# Patient Record
Sex: FEMALE | Race: BLACK OR AFRICAN AMERICAN | NOT HISPANIC OR LATINO | Employment: FULL TIME | URBAN - METROPOLITAN AREA
[De-identification: names, ages, dates, MRNs, and addresses within clinical notes are randomized per-mention and may not be internally consistent; named-entity substitution may affect disease eponyms.]

---

## 2019-03-01 PROCEDURE — 99284 EMERGENCY DEPT VISIT MOD MDM: CPT | Mod: ER

## 2019-03-02 ENCOUNTER — HOSPITAL ENCOUNTER (EMERGENCY)
Facility: HOSPITAL | Age: 39
Discharge: HOME | End: 2019-03-02
Attending: INTERNAL MEDICINE
Payer: MEDICAID

## 2019-03-02 VITALS
WEIGHT: 122 LBS | SYSTOLIC BLOOD PRESSURE: 130 MMHG | DIASTOLIC BLOOD PRESSURE: 87 MMHG | HEART RATE: 70 BPM | BODY MASS INDEX: 24.6 KG/M2 | HEIGHT: 59 IN | OXYGEN SATURATION: 100 % | TEMPERATURE: 98 F | RESPIRATION RATE: 18 BRPM

## 2019-03-02 DIAGNOSIS — N30.01 ACUTE CYSTITIS WITH HEMATURIA: Primary | ICD-10-CM

## 2019-03-02 LAB
B-HCG UR QL: NEGATIVE
BILIRUBIN, POC UA: NEGATIVE
BLOOD, POC UA: ABNORMAL
CLARITY, POC UA: CLEAR
COLOR, POC UA: YELLOW
CTP QC/QA: YES
GLUCOSE, POC UA: NEGATIVE
KETONES, POC UA: ABNORMAL
LEUKOCYTE EST, POC UA: ABNORMAL
NITRITE, POC UA: NEGATIVE
PH UR STRIP: 6 [PH]
PROTEIN, POC UA: ABNORMAL
SPECIFIC GRAVITY, POC UA: 1.02
UROBILINOGEN, POC UA: 1 E.U./DL

## 2019-03-02 PROCEDURE — 25000003 PHARM REV CODE 250: Mod: ER | Performed by: INTERNAL MEDICINE

## 2019-03-02 PROCEDURE — 81003 URINALYSIS AUTO W/O SCOPE: CPT | Mod: ER

## 2019-03-02 PROCEDURE — 81025 URINE PREGNANCY TEST: CPT | Mod: ER | Performed by: INTERNAL MEDICINE

## 2019-03-02 RX ORDER — PHENAZOPYRIDINE HYDROCHLORIDE 200 MG/1
200 TABLET, FILM COATED ORAL 3 TIMES DAILY
Qty: 6 TABLET | Refills: 0 | Status: SHIPPED | OUTPATIENT
Start: 2019-03-02 | End: 2019-03-04

## 2019-03-02 RX ORDER — PHENAZOPYRIDINE HYDROCHLORIDE 100 MG/1
200 TABLET, FILM COATED ORAL
Status: COMPLETED | OUTPATIENT
Start: 2019-03-02 | End: 2019-03-02

## 2019-03-02 RX ORDER — CEPHALEXIN 500 MG/1
500 CAPSULE ORAL EVERY 12 HOURS
Qty: 20 CAPSULE | Refills: 0 | Status: SHIPPED | OUTPATIENT
Start: 2019-03-02 | End: 2019-03-12

## 2019-03-02 RX ORDER — CEPHALEXIN 500 MG/1
500 CAPSULE ORAL
Status: COMPLETED | OUTPATIENT
Start: 2019-03-02 | End: 2019-03-02

## 2019-03-02 RX ADMIN — PHENAZOPYRIDINE HYDROCHLORIDE 200 MG: 100 TABLET ORAL at 12:03

## 2019-03-02 RX ADMIN — CEPHALEXIN 500 MG: 500 CAPSULE ORAL at 12:03

## 2019-03-02 NOTE — ED PROVIDER NOTES
"Encounter Date: 3/1/2019    SCRIBE #1 NOTE: I, Sang Allan, am scribing for, and in the presence of,  Dr. Desai. I have scribed the following portions of the note - Other sections scribed: HPI, ROS, PE.       History     Chief Complaint   Patient presents with    painful and frequent urination     pt c/o painful and frequent urination x "few " days, denies n/v/d or other complaints     This is a 38 y.o. female who presents to the ED with a complaint of suprapubic abdominal pain that began a few days ago.  Pt endorses urinary frequency.  She has not taken medication PTA.  Palpation worsens her pain. She denies fever, chills, nausea, emesis, diarrhea, vaginal bleeding/discharge, or dysuria.          The history is provided by the patient.   Abdominal Pain   The current episode started just prior to arrival. The onset of the illness was gradual. The problem has not changed since onset.The abdominal pain is located in the suprapubic region. The abdominal pain does not radiate. The abdominal pain is relieved by nothing. Exacerbated by: Nothing. The other symptoms of the illness do not include fever, vomiting, diarrhea, dysuria, vaginal discharge or vaginal bleeding.   The patient states that she believes she is currently not pregnant. Additional symptoms associated with the illness include frequency. Symptoms associated with the illness do not include chills, hematuria or back pain.     Review of patient's allergies indicates:  No Known Allergies  History reviewed. No pertinent past medical history.  Past Surgical History:   Procedure Laterality Date    tubaligation       History reviewed. No pertinent family history.  Social History     Tobacco Use    Smoking status: Current Every Day Smoker    Tobacco comment: 2-3 cigs daily   Substance Use Topics    Alcohol use: No     Frequency: Never    Drug use: No     Review of Systems   Constitutional: Negative for chills and fever.   Gastrointestinal: Positive for " abdominal pain. Negative for diarrhea and vomiting.   Genitourinary: Positive for frequency. Negative for dysuria, hematuria, vaginal bleeding and vaginal discharge.   Musculoskeletal: Negative for back pain.   All other systems reviewed and are negative.      Physical Exam     Initial Vitals [03/02/19 0003]   BP Pulse Resp Temp SpO2   135/88 74 16 97.6 °F (36.4 °C) 100 %      MAP       --         Physical Exam    Nursing note and vitals reviewed.  Constitutional: She appears well-developed and well-nourished.   HENT:   Head: Normocephalic and atraumatic.   Eyes: Conjunctivae are normal.   Neck: Normal range of motion. Neck supple.   Cardiovascular: Normal rate, regular rhythm, normal heart sounds and intact distal pulses. Exam reveals no gallop and no friction rub.    No murmur heard.  Pulmonary/Chest: Effort normal and breath sounds normal. No respiratory distress. She has no wheezes. She has no rhonchi. She has no rales. She exhibits no tenderness.   Abdominal: Soft. There is tenderness in the suprapubic area. There is no rigidity, no rebound, no guarding and no CVA tenderness.   Musculoskeletal: Normal range of motion.        Lumbar back: She exhibits normal range of motion, no tenderness, no bony tenderness, no swelling, no edema, no deformity, no laceration, no pain, no spasm and normal pulse.   Neurological: She is alert and oriented to person, place, and time.   Skin: Skin is warm and dry.   Psychiatric: She has a normal mood and affect.         ED Course   Procedures  Labs Reviewed   POCT URINALYSIS W/O SCOPE - Abnormal; Notable for the following components:       Result Value    Glucose, UA Negative (*)     Bilirubin, UA Negative (*)     Ketones, UA Trace (*)     Blood, UA 3+ (*)     Protein, UA 1+ (*)     Nitrite, UA Negative (*)     Leukocytes, UA 1+ (*)     All other components within normal limits   POCT URINALYSIS W/O SCOPE   POCT URINE PREGNANCY          Imaging Results    None          Medical  Decision Making:   Initial Assessment:   This is a 38 y.o. female who presents to the ED with a complaint of suprapubic abdominal pain that began a few days ago.  Pt endorses urinary frequency.  She has not taken medication PTA.  Palpation worsens her pain. She denies fever, chills, nausea, emesis, diarrhea, vaginal bleeding/discharge, or dysuria.  Clinical Tests:   Lab Tests: Ordered and Reviewed  ED Management:  Urinalysis reveals signs of infection.  Patient was given instructions for acute cystitis and received Keflex in the emergency department as well as a prescription.  She was advised to follow up with her primary care physician within the next 4 days for re-evaluation and to return to the emergency department if condition worsens.            Scribe Attestation:   Scribe #1: I performed the above scribed service and the documentation accurately describes the services I performed. I attest to the accuracy of the note.    This document was produced by a scribe under my direction and in my presence. I agree with the content of the note and have made any necessary edits.     Dr. Desai    03/02/2019 5:31 AM             Clinical Impression:     1. Acute cystitis with hematuria           Disposition:   Disposition: Discharged  Condition: Stable                        Pedro Desai MD  03/02/19 0531

## 2019-03-18 ENCOUNTER — HOSPITAL ENCOUNTER (EMERGENCY)
Facility: HOSPITAL | Age: 39
Discharge: HOME | End: 2019-03-18
Attending: INTERNAL MEDICINE
Payer: MEDICAID

## 2019-03-18 VITALS
OXYGEN SATURATION: 99 % | SYSTOLIC BLOOD PRESSURE: 124 MMHG | DIASTOLIC BLOOD PRESSURE: 79 MMHG | BODY MASS INDEX: 24.6 KG/M2 | HEART RATE: 78 BPM | TEMPERATURE: 98 F | HEIGHT: 59 IN | WEIGHT: 122 LBS | RESPIRATION RATE: 17 BRPM

## 2019-03-18 DIAGNOSIS — M54.32 SCIATICA OF LEFT SIDE: Primary | ICD-10-CM

## 2019-03-18 PROBLEM — N10 ACUTE PYELONEPHRITIS: Status: ACTIVE | Noted: 2019-03-18

## 2019-03-18 LAB
B-HCG UR QL: NEGATIVE
BILIRUBIN, POC UA: ABNORMAL
BLOOD, POC UA: NEGATIVE
CLARITY, POC UA: CLEAR
COLOR, POC UA: ABNORMAL
CTP QC/QA: YES
GLUCOSE, POC UA: NEGATIVE
KETONES, POC UA: ABNORMAL
LEUKOCYTE EST, POC UA: NEGATIVE
NITRITE, POC UA: NEGATIVE
PH UR STRIP: 6 [PH]
PROTEIN, POC UA: ABNORMAL
SPECIFIC GRAVITY, POC UA: >=1.03
UROBILINOGEN, POC UA: 0.2 E.U./DL

## 2019-03-18 PROCEDURE — 81025 URINE PREGNANCY TEST: CPT | Mod: ER | Performed by: INTERNAL MEDICINE

## 2019-03-18 PROCEDURE — 25000003 PHARM REV CODE 250: Mod: ER | Performed by: INTERNAL MEDICINE

## 2019-03-18 PROCEDURE — 81003 URINALYSIS AUTO W/O SCOPE: CPT | Mod: ER

## 2019-03-18 PROCEDURE — 99283 EMERGENCY DEPT VISIT LOW MDM: CPT | Mod: ER

## 2019-03-18 RX ORDER — IBUPROFEN 600 MG/1
600 TABLET ORAL
Status: COMPLETED | OUTPATIENT
Start: 2019-03-18 | End: 2019-03-18

## 2019-03-18 RX ORDER — IBUPROFEN 600 MG/1
600 TABLET ORAL 3 TIMES DAILY
Qty: 30 TABLET | Refills: 0 | OUTPATIENT
Start: 2019-03-18 | End: 2021-08-09

## 2019-03-18 RX ORDER — CEPHALEXIN 500 MG/1
500 CAPSULE ORAL EVERY 12 HOURS
Qty: 20 CAPSULE | Refills: 0 | Status: SHIPPED | OUTPATIENT
Start: 2019-03-18 | End: 2019-03-18 | Stop reason: SDUPTHER

## 2019-03-18 RX ORDER — IBUPROFEN 600 MG/1
600 TABLET ORAL 3 TIMES DAILY
Qty: 30 TABLET | Refills: 0 | Status: SHIPPED | OUTPATIENT
Start: 2019-03-18 | End: 2019-03-18 | Stop reason: SDUPTHER

## 2019-03-18 RX ORDER — CEPHALEXIN 500 MG/1
500 CAPSULE ORAL
Status: COMPLETED | OUTPATIENT
Start: 2019-03-18 | End: 2019-03-18

## 2019-03-18 RX ADMIN — IBUPROFEN 600 MG: 600 TABLET, FILM COATED ORAL at 03:03

## 2019-03-18 RX ADMIN — CEPHALEXIN 500 MG: 500 CAPSULE ORAL at 02:03

## 2019-03-18 NOTE — ED PROVIDER NOTES
Encounter Date: 3/18/2019       History     Chief Complaint   Patient presents with    Back Pain     pt c/o back and r flank pain x 1 week,pt denies dysuria     38-year-old female presents to the emergency department complaining of left lower back pain radiating to left buttocks x1 week.  She denies fever/chills, nausea/vomiting/urinary frequency/dysuria.      The history is provided by the patient. No  was used.     Review of patient's allergies indicates:  No Known Allergies  History reviewed. No pertinent past medical history.  Past Surgical History:   Procedure Laterality Date    tubaligation       History reviewed. No pertinent family history.  Social History     Tobacco Use    Smoking status: Current Every Day Smoker    Tobacco comment: 2-3 cigs daily   Substance Use Topics    Alcohol use: No     Frequency: Never    Drug use: No     Review of Systems   Genitourinary: Negative for difficulty urinating, dysuria, enuresis, flank pain, frequency, hematuria, pelvic pain, urgency, vaginal bleeding and vaginal discharge.   Musculoskeletal: Positive for back pain.   All other systems reviewed and are negative.      Physical Exam     Initial Vitals   BP Pulse Resp Temp SpO2   03/18/19 0104 03/18/19 0103 03/18/19 0103 03/18/19 0104 03/18/19 0103   117/86 83 18 98.3 °F (36.8 °C) 100 %      MAP       --                Physical Exam    Nursing note and vitals reviewed.  Constitutional: She appears well-developed and well-nourished. No distress.   HENT:   Head: Normocephalic and atraumatic.   Right Ear: External ear normal.   Left Ear: External ear normal.   Eyes: Conjunctivae and EOM are normal.   Neck: Normal range of motion. Neck supple.   Cardiovascular: Normal rate and regular rhythm.   Pulmonary/Chest: Breath sounds normal. No respiratory distress.   Abdominal: Soft. Bowel sounds are normal.   Musculoskeletal: Normal range of motion.   Left lumbar pain upon movement with radiation to her left  buttocks and thigh   Neurological: She is alert and oriented to person, place, and time. She has normal strength.   Skin: Skin is warm and dry.   Psychiatric: She has a normal mood and affect. Her behavior is normal. Thought content normal.         ED Course   Procedures  Labs Reviewed   POCT URINALYSIS W/O SCOPE - Abnormal; Notable for the following components:       Result Value    Glucose, UA Negative (*)     Bilirubin, UA 1+ (*)     Ketones, UA Trace (*)     Spec Grav UA >=1.030 (*)     Blood, UA Negative (*)     Protein, UA Trace (*)     Nitrite, UA Negative (*)     Leukocytes, UA Negative (*)     All other components within normal limits   POCT URINALYSIS W/O SCOPE   POCT URINE PREGNANCY          Imaging Results    None          Medical Decision Making:   Initial Assessment:   38-year-old female presents to the emergency department complaining of left lower back pain radiating to left buttocks x1 week.  She denies fever/chills, nausea/vomiting/urinary frequency/dysuria.  ED Management:  Patient was given instructions for sciatica of left-sided and prescriptions for ibuprofen.  Ibuprofen was given in the emergency department and patient was advised to follow up with her primary care physician tomorrow for re-evaluation/return to the emergency department if condition worsens.                      Clinical Impression:       ICD-10-CM ICD-9-CM   1. Sciatica of left side M54.32 724.3         Disposition:   Disposition: Discharged  Condition: Stable                        Pedro Desai MD  03/18/19 0621

## 2019-04-04 ENCOUNTER — HOSPITAL ENCOUNTER (EMERGENCY)
Facility: HOSPITAL | Age: 39
Discharge: HOME | End: 2019-04-04
Attending: INTERNAL MEDICINE
Payer: MEDICAID

## 2019-04-04 VITALS
RESPIRATION RATE: 16 BRPM | OXYGEN SATURATION: 99 % | HEART RATE: 77 BPM | BODY MASS INDEX: 24.8 KG/M2 | TEMPERATURE: 98 F | WEIGHT: 123 LBS | DIASTOLIC BLOOD PRESSURE: 91 MMHG | SYSTOLIC BLOOD PRESSURE: 122 MMHG | HEIGHT: 59 IN

## 2019-04-04 DIAGNOSIS — A08.4 VIRAL GASTROENTERITIS: Primary | ICD-10-CM

## 2019-04-04 LAB
B-HCG UR QL: NEGATIVE
CTP QC/QA: YES

## 2019-04-04 PROCEDURE — 25000003 PHARM REV CODE 250: Mod: ER | Performed by: INTERNAL MEDICINE

## 2019-04-04 PROCEDURE — 81025 URINE PREGNANCY TEST: CPT | Mod: ER | Performed by: INTERNAL MEDICINE

## 2019-04-04 PROCEDURE — 99283 EMERGENCY DEPT VISIT LOW MDM: CPT | Mod: ER

## 2019-04-04 RX ORDER — ONDANSETRON 4 MG/1
8 TABLET, ORALLY DISINTEGRATING ORAL
Status: COMPLETED | OUTPATIENT
Start: 2019-04-04 | End: 2019-04-04

## 2019-04-04 RX ORDER — ONDANSETRON 4 MG/1
4 TABLET, FILM COATED ORAL EVERY 6 HOURS PRN
Qty: 12 TABLET | Refills: 0 | OUTPATIENT
Start: 2019-04-04 | End: 2019-10-20

## 2019-04-04 RX ADMIN — ONDANSETRON 8 MG: 4 TABLET, ORALLY DISINTEGRATING ORAL at 07:04

## 2019-04-05 NOTE — ED PROVIDER NOTES
Encounter Date: 4/4/2019    SCRIBE #1 NOTE: I, Sang Allan, am scribing for, and in the presence of,  Dr. Desai. I have scribed the following portions of the note - Other sections scribed: HPI, ROS, PE.       History     Chief Complaint   Patient presents with    Vomiting     pt reports 2 episodes of vomiting and 4 episodes of diarrhea since yesterday with continous nausea. Denies any abd pain     CC:   N/V/D  HPI:  This is a 38 y.o. female who presents to the ED with a chief complaint of acute N/V/D that began yesterday.  Pt hs had three episodes of emesis and three episodes of diarrhea today.  She denies fever, chills, SOB, CP, or abdominal pain.  She has not taken medication for her symptoms.         The history is provided by the patient.     Review of patient's allergies indicates:  No Known Allergies  History reviewed. No pertinent past medical history.  Past Surgical History:   Procedure Laterality Date    tubaligation       History reviewed. No pertinent family history.  Social History     Tobacco Use    Smoking status: Current Every Day Smoker     Packs/day: 0.50    Smokeless tobacco: Never Used    Tobacco comment: 2-3 cigs daily   Substance Use Topics    Alcohol use: No     Frequency: Never    Drug use: No     Review of Systems   Constitutional: Negative for chills and fever.   Respiratory: Negative for cough and shortness of breath.    Cardiovascular: Negative for chest pain.   Gastrointestinal: Positive for diarrhea, nausea and vomiting. Negative for abdominal pain.   All other systems reviewed and are negative.      Physical Exam     Initial Vitals [04/04/19 1900]   BP Pulse Resp Temp SpO2   (!) 122/91 77 16 98.2 °F (36.8 °C) 99 %      MAP       --         Physical Exam    Nursing note and vitals reviewed.  Constitutional: She appears well-developed and well-nourished.   HENT:   Head: Normocephalic and atraumatic.   Eyes: Conjunctivae are normal.   Neck: Normal range of motion. Neck supple.    Cardiovascular: Normal rate, regular rhythm, normal heart sounds and intact distal pulses. Exam reveals no gallop and no friction rub.    No murmur heard.  Pulmonary/Chest: Effort normal and breath sounds normal. No respiratory distress. She has no wheezes. She has no rhonchi. She has no rales. She exhibits no tenderness.   Abdominal: Soft. Bowel sounds are normal. She exhibits no distension and no mass. There is no tenderness. There is no rebound and no guarding.   Musculoskeletal: Normal range of motion.   Neurological: She is alert and oriented to person, place, and time.   Skin: Skin is warm and dry.   Psychiatric: She has a normal mood and affect.         ED Course   Procedures  Labs Reviewed   POCT URINE PREGNANCY          Imaging Results    None          Medical Decision Making:   Initial Assessment:   This is a 38 y.o. female who presents to the ED with a chief complaint of acute N/V/D that began yesterday.  Pt hs had three episodes of emesis and three episodes of diarrhea today.  She denies fever, chills, SOB, CP, or abdominal pain.  She has not taken medication for her symptoms.       Clinical Tests:   Lab Tests: Ordered            Scribe Attestation:   Scribe #1: I performed the above scribed service and the documentation accurately describes the services I performed. I attest to the accuracy of the note.    This document was produced by a scribe under my direction and in my presence. I agree with the content of the note and have made any necessary edits.     Dr. Desai    04/05/2019 3:04 AM             Clinical Impression:     1. Viral gastroenteritis           Disposition:   Disposition: Discharged  Condition: Stable                        Pedro Desai MD  04/05/19 3796

## 2019-10-20 ENCOUNTER — HOSPITAL ENCOUNTER (EMERGENCY)
Facility: HOSPITAL | Age: 39
Discharge: HOME | End: 2019-10-20
Attending: INTERNAL MEDICINE
Payer: MEDICAID

## 2019-10-20 VITALS
HEART RATE: 78 BPM | BODY MASS INDEX: 23.79 KG/M2 | SYSTOLIC BLOOD PRESSURE: 126 MMHG | DIASTOLIC BLOOD PRESSURE: 75 MMHG | OXYGEN SATURATION: 99 % | WEIGHT: 118 LBS | HEIGHT: 59 IN | RESPIRATION RATE: 20 BRPM | TEMPERATURE: 98 F

## 2019-10-20 DIAGNOSIS — R11.0 NAUSEA: Primary | ICD-10-CM

## 2019-10-20 LAB
B-HCG UR QL: NEGATIVE
BILIRUBIN, POC UA: ABNORMAL
BLOOD, POC UA: NEGATIVE
CLARITY, POC UA: CLEAR
COLOR, POC UA: ABNORMAL
CTP QC/QA: YES
GLUCOSE, POC UA: NEGATIVE
KETONES, POC UA: ABNORMAL
LEUKOCYTE EST, POC UA: NEGATIVE
NITRITE, POC UA: NEGATIVE
PH UR STRIP: 6 [PH]
PROTEIN, POC UA: ABNORMAL
SPECIFIC GRAVITY, POC UA: 1.02
UROBILINOGEN, POC UA: 1 E.U./DL

## 2019-10-20 PROCEDURE — 25000003 PHARM REV CODE 250: Mod: ER | Performed by: INTERNAL MEDICINE

## 2019-10-20 PROCEDURE — 81003 URINALYSIS AUTO W/O SCOPE: CPT | Mod: ER

## 2019-10-20 PROCEDURE — 81025 URINE PREGNANCY TEST: CPT | Mod: ER | Performed by: INTERNAL MEDICINE

## 2019-10-20 PROCEDURE — 99283 EMERGENCY DEPT VISIT LOW MDM: CPT | Mod: 25,ER

## 2019-10-20 RX ORDER — ONDANSETRON 4 MG/1
8 TABLET, ORALLY DISINTEGRATING ORAL
Status: COMPLETED | OUTPATIENT
Start: 2019-10-20 | End: 2019-10-20

## 2019-10-20 RX ORDER — ONDANSETRON 4 MG/1
4 TABLET, FILM COATED ORAL EVERY 6 HOURS PRN
Qty: 12 TABLET | Refills: 0 | OUTPATIENT
Start: 2019-10-20 | End: 2021-08-09

## 2019-10-20 RX ADMIN — ONDANSETRON 8 MG: 4 TABLET, ORALLY DISINTEGRATING ORAL at 09:10

## 2019-10-21 NOTE — ED NOTES
Pt walked in to waiting room enquiring if she had been called to a room or not.  Pt was not seen outside earlier but is now here requesting to be seen.

## 2020-01-21 ENCOUNTER — HOSPITAL ENCOUNTER (EMERGENCY)
Facility: HOSPITAL | Age: 40
Discharge: HOME | End: 2020-01-21
Attending: EMERGENCY MEDICINE
Payer: MEDICAID

## 2020-01-21 VITALS
RESPIRATION RATE: 20 BRPM | SYSTOLIC BLOOD PRESSURE: 110 MMHG | DIASTOLIC BLOOD PRESSURE: 73 MMHG | WEIGHT: 118 LBS | TEMPERATURE: 98 F | BODY MASS INDEX: 23.79 KG/M2 | OXYGEN SATURATION: 100 % | HEIGHT: 59 IN | HEART RATE: 61 BPM

## 2020-01-21 DIAGNOSIS — M54.9 BACK PAIN, UNSPECIFIED BACK LOCATION, UNSPECIFIED BACK PAIN LATERALITY, UNSPECIFIED CHRONICITY: ICD-10-CM

## 2020-01-21 DIAGNOSIS — M79.10 MYALGIA: ICD-10-CM

## 2020-01-21 DIAGNOSIS — J11.1 INFLUENZA: Primary | ICD-10-CM

## 2020-01-21 LAB
B-HCG UR QL: NEGATIVE
BILIRUBIN, POC UA: ABNORMAL
BLOOD, POC UA: NEGATIVE
CLARITY, POC UA: CLEAR
COLOR, POC UA: YELLOW
CTP QC/QA: YES
CTP QC/QA: YES
GLUCOSE, POC UA: NEGATIVE
KETONES, POC UA: NEGATIVE
LEUKOCYTE EST, POC UA: NEGATIVE
NITRITE, POC UA: NEGATIVE
PH UR STRIP: 5.5 [PH]
POC MOLECULAR INFLUENZA A AGN: NEGATIVE
POC MOLECULAR INFLUENZA B AGN: NEGATIVE
PROTEIN, POC UA: ABNORMAL
SPECIFIC GRAVITY, POC UA: >=1.03
UROBILINOGEN, POC UA: 0.2 E.U./DL

## 2020-01-21 PROCEDURE — 81003 URINALYSIS AUTO W/O SCOPE: CPT | Mod: ER

## 2020-01-21 PROCEDURE — 81025 URINE PREGNANCY TEST: CPT | Mod: ER | Performed by: EMERGENCY MEDICINE

## 2020-01-21 PROCEDURE — 87502 INFLUENZA DNA AMP PROBE: CPT | Mod: ER

## 2020-01-21 PROCEDURE — 99284 EMERGENCY DEPT VISIT MOD MDM: CPT | Mod: ER

## 2020-01-21 RX ORDER — DICLOFENAC SODIUM 50 MG/1
50 TABLET, DELAYED RELEASE ORAL 2 TIMES DAILY
Qty: 20 TABLET | Refills: 0 | OUTPATIENT
Start: 2020-01-21 | End: 2021-08-09

## 2020-01-21 RX ORDER — OSELTAMIVIR PHOSPHATE 75 MG/1
75 CAPSULE ORAL 2 TIMES DAILY
Qty: 10 CAPSULE | Refills: 0 | Status: SHIPPED | OUTPATIENT
Start: 2020-01-21 | End: 2020-01-26

## 2020-01-21 RX ORDER — BENZONATATE 100 MG/1
100 CAPSULE ORAL 3 TIMES DAILY PRN
Qty: 20 CAPSULE | Refills: 0 | Status: SHIPPED | OUTPATIENT
Start: 2020-01-21 | End: 2020-01-31

## 2020-01-21 NOTE — ED PROVIDER NOTES
"Encounter Date: 1/21/2020    SCRIBE #1 NOTE: I, Harlan Miner, am scribing for, and in the presence of,  DONNY Gomez. I have scribed the following portions of the note - Other sections scribed: HPI, ROS, PE.       History     Chief Complaint   Patient presents with    Influenza     onset several days, chills, cough, congestion.    Back Pain     hx of sciatica, onset last night, lower back pain     39 year old female with sciatica presents to the ED with constant lower back pain onset last night. 8/10, described as "dull", exacerbated with movement, relieved with sitting still. Patient also reports fevers, chills, coughing, runny nose (clear or green), congestion, body aches, diarrhea, and nausea. Denies any injury/trauma, numbness, weakness, urinary problems, sore throat, ear pain, or vomiting. Positive sick contact with children diagnosed with flu. Patient is a cook in a restaurant.    The history is provided by the patient. No  was used.     Review of patient's allergies indicates:  No Known Allergies  History reviewed. No pertinent past medical history.  Past Surgical History:   Procedure Laterality Date    tubaligation       History reviewed. No pertinent family history.  Social History     Tobacco Use    Smoking status: Current Every Day Smoker     Packs/day: 0.50    Smokeless tobacco: Never Used    Tobacco comment: 2-3 cigs daily   Substance Use Topics    Alcohol use: No     Frequency: Never    Drug use: No     Review of Systems   Constitutional: Positive for chills and fever.   HENT: Positive for congestion and rhinorrhea. Negative for ear pain and sore throat.    Respiratory: Positive for cough.    Gastrointestinal: Positive for diarrhea and nausea. Negative for constipation and vomiting.   Genitourinary: Negative for decreased urine volume, difficulty urinating, dysuria, hematuria and urgency.   Musculoskeletal: Positive for back pain and myalgias.   Neurological: Negative " for weakness and numbness.   All other systems reviewed and are negative.      Physical Exam     Initial Vitals [01/21/20 1251]   BP Pulse Resp Temp SpO2   110/73 61 20 98.4 °F (36.9 °C) 100 %      MAP       --         Physical Exam    Nursing note and vitals reviewed.  Constitutional: She appears well-developed and well-nourished. She is not diaphoretic. No distress.   HENT:   Head: Normocephalic and atraumatic.   Right Ear: External ear normal.   Left Ear: External ear normal.   Nose: Nose normal.   Mouth/Throat: Oropharynx is clear and moist.   There is mild bilateral nasal congestion noted.  There is mild posterior pharyngeal erythema without tonsillar exudate.   Eyes: Conjunctivae are normal.   Neck: Normal range of motion. Neck supple.   Cardiovascular: Normal rate.   Pulmonary/Chest: Breath sounds normal. No respiratory distress. She has no wheezes. She has no rhonchi. She has no rales. She exhibits no tenderness.   No CVA tenderness noted.   Abdominal: Soft. Bowel sounds are normal. She exhibits no distension. There is no tenderness. There is no rebound and no guarding.   Musculoskeletal: Normal range of motion.   Neurological: She is alert and oriented to person, place, and time.   Skin: Skin is warm and dry. Capillary refill takes less than 2 seconds. No rash noted. No erythema.   Psychiatric: She has a normal mood and affect.         ED Course   Procedures  Labs Reviewed   POCT URINALYSIS W/O SCOPE - Abnormal; Notable for the following components:       Result Value    Bilirubin, UA 1+ (*)     Spec Grav UA >=1.030 (*)     Protein, UA Trace (*)     All other components within normal limits   POCT URINE PREGNANCY   POCT INFLUENZA A/B MOLECULAR          Imaging Results    None       X-Rays:   Independently Interpreted Readings:   Other Readings:    X-ray of the lumbar spine reveals    Medical Decision Making:   History:   Old Medical Records: I decided to obtain old medical records.  Clinical Tests:   Lab  Tests: Ordered and Reviewed  The following lab test(s) were unremarkable: UPT       APC / Resident Notes:   This is an urgent evaluation of a 39-year-old female with a past medical history of sciatica who presents to the emergency department complaining of lower back pain and flu-like symptoms. She reports that both of her children were recently diagnosed with the flu.  Her  is also sick with similar symptoms.    The patient is currently afebrile and nontoxic in appearance.  Vital signs are stable. On physical exam, there is bilateral nasal congestion noted.  There is mild posterior pharyngeal erythema.  However, the bilateral lungs are clear to auscultation.  There is no CVA tenderness noted.  There is a mild tenderness along the bilateral lower lumbar spine region.  There is no midline spinal tenderness or bony step-off noted.  The remaining physical exam is unremarkable.  Urinalysis was performed which revealed no evidence of urinary tract infection.  I carefully considered but doubt pyelonephritis.  Rapid influenza was performed which was negative. I believe this patient has the flu despite her negative influenza screen.  The patient is too sick contacts at recently had similar symptoms. Therefore, will treat with Tamiflu.  Carefully considered but doubt pneumonia.  She is currently safe and stable for discharge at this time.       Scribe Attestation:   Scribe #1: I performed the above scribed service and the documentation accurately describes the services I performed. I attest to the accuracy of the note.                          Clinical Impression:     1. Influenza    2. Myalgia    3. Back pain, unspecified back location, unspecified back pain laterality, unspecified chronicity            Disposition:   Disposition: Discharged  Condition: Stable                     Madai Beebe PA-C  01/21/20 0163

## 2020-01-21 NOTE — DISCHARGE INSTRUCTIONS
Rest.  Drink plenty of fluids.  Return to the emergency department if you have any change or concerning symptoms.  Take medication with food.  Please follow-up with your doctor within 48 hr.

## 2020-10-15 ENCOUNTER — HOSPITAL ENCOUNTER (EMERGENCY)
Facility: HOSPITAL | Age: 40
Discharge: HOME | End: 2020-10-15
Attending: EMERGENCY MEDICINE
Payer: MEDICAID

## 2020-10-15 VITALS
OXYGEN SATURATION: 97 % | SYSTOLIC BLOOD PRESSURE: 127 MMHG | HEART RATE: 77 BPM | RESPIRATION RATE: 18 BRPM | HEIGHT: 59 IN | WEIGHT: 115 LBS | BODY MASS INDEX: 23.18 KG/M2 | DIASTOLIC BLOOD PRESSURE: 68 MMHG | TEMPERATURE: 97 F

## 2020-10-15 DIAGNOSIS — N39.0 URINARY TRACT INFECTION WITH HEMATURIA, SITE UNSPECIFIED: Primary | ICD-10-CM

## 2020-10-15 DIAGNOSIS — R31.9 URINARY TRACT INFECTION WITH HEMATURIA, SITE UNSPECIFIED: Primary | ICD-10-CM

## 2020-10-15 LAB
B-HCG UR QL: NEGATIVE
BILIRUBIN, POC UA: ABNORMAL
BLOOD, POC UA: ABNORMAL
CLARITY, POC UA: CLEAR
COLOR, POC UA: ABNORMAL
CTP QC/QA: YES
GLUCOSE, POC UA: NEGATIVE
KETONES, POC UA: NEGATIVE
LEUKOCYTE EST, POC UA: ABNORMAL
NITRITE, POC UA: NEGATIVE
PH UR STRIP: 5.5 [PH]
PROTEIN, POC UA: ABNORMAL
SPECIFIC GRAVITY, POC UA: >=1.03
UROBILINOGEN, POC UA: 1 E.U./DL

## 2020-10-15 PROCEDURE — 25000003 PHARM REV CODE 250: Mod: ER | Performed by: NURSE PRACTITIONER

## 2020-10-15 PROCEDURE — 87186 SC STD MICRODIL/AGAR DIL: CPT

## 2020-10-15 PROCEDURE — 87077 CULTURE AEROBIC IDENTIFY: CPT

## 2020-10-15 PROCEDURE — 87086 URINE CULTURE/COLONY COUNT: CPT

## 2020-10-15 PROCEDURE — 81025 URINE PREGNANCY TEST: CPT | Mod: ER | Performed by: EMERGENCY MEDICINE

## 2020-10-15 PROCEDURE — 63600175 PHARM REV CODE 636 W HCPCS: Mod: ER | Performed by: NURSE PRACTITIONER

## 2020-10-15 PROCEDURE — 87088 URINE BACTERIA CULTURE: CPT

## 2020-10-15 PROCEDURE — 96372 THER/PROPH/DIAG INJ SC/IM: CPT | Mod: ER

## 2020-10-15 PROCEDURE — 99284 EMERGENCY DEPT VISIT MOD MDM: CPT | Mod: 25,ER

## 2020-10-15 PROCEDURE — 81003 URINALYSIS AUTO W/O SCOPE: CPT | Mod: ER

## 2020-10-15 RX ORDER — CEFTRIAXONE 1 G/1
1 INJECTION, POWDER, FOR SOLUTION INTRAMUSCULAR; INTRAVENOUS
Status: COMPLETED | OUTPATIENT
Start: 2020-10-15 | End: 2020-10-15

## 2020-10-15 RX ORDER — CEPHALEXIN 500 MG/1
500 CAPSULE ORAL EVERY 12 HOURS
Qty: 14 CAPSULE | Refills: 0 | Status: SHIPPED | OUTPATIENT
Start: 2020-10-15 | End: 2020-10-22

## 2020-10-15 RX ORDER — LIDOCAINE HYDROCHLORIDE 10 MG/ML
5 INJECTION INFILTRATION; PERINEURAL
Status: COMPLETED | OUTPATIENT
Start: 2020-10-15 | End: 2020-10-15

## 2020-10-15 RX ADMIN — CEFTRIAXONE SODIUM 1 G: 1 INJECTION, POWDER, FOR SOLUTION INTRAMUSCULAR; INTRAVENOUS at 03:10

## 2020-10-15 RX ADMIN — LIDOCAINE HYDROCHLORIDE 5 ML: 10 INJECTION, SOLUTION INFILTRATION; PERINEURAL at 03:10

## 2020-10-15 NOTE — DISCHARGE INSTRUCTIONS
Take antibiotics as prescribed until they are gone even if your symptoms improve.  You should not have any pills left over.  Drink plenty of water and other hydrating fluids.  Follow-up with your regular doctor.  Return to the emergency department for any new or worsening symptoms.    Thank you for coming to our Emergency Department today. It is important to remember that some problems are difficult to diagnose and may not be found during your first visit. Be sure to follow up with your primary care doctor.  If you do not have one, you may contact the one listed on your discharge paperwork or you may also call the [facility] Appointment Desk at [phone] to schedule an appointment with one.     Return to the ER with any questions/concerns, new/concerning symptoms, worsening or failure to improve. Do not drive or make any important decisions for 24 hours if you have received any pain medications, sedatives or mood altering drugs during your ER visit.

## 2020-10-15 NOTE — ED PROVIDER NOTES
Encounter Date: 10/15/2020       History     Chief Complaint   Patient presents with    Urinary Tract Infection     Pt to ER with c/o dysuria with frequency x 1 week. no discharge.      40-year-old female presenting for evaluation of dysuria and urinary frequency that began about 1 week ago.  Reports that her symptoms spontaneously resolved but reoccurred about 1 or 2 days ago and have been constant since then.  She also reports bilateral lumbar back aching.  Denies abdominal pain, nausea, vomiting, fever, hematuria, vaginal discharge, vaginal bleeding, or any other symptoms.  Denies concern for STDs.  Reports that she has had frequent UTIs in that her symptoms are similar to past UTIs.  No medications or treatments attempted for her symptoms prior to arrival.        Review of patient's allergies indicates:  No Known Allergies  History reviewed. No pertinent past medical history.  Past Surgical History:   Procedure Laterality Date    tubaligation       History reviewed. No pertinent family history.  Social History     Tobacco Use    Smoking status: Current Every Day Smoker     Packs/day: 0.50    Smokeless tobacco: Never Used    Tobacco comment: 2-3 cigs daily   Substance Use Topics    Alcohol use: No     Frequency: Never    Drug use: No     Review of Systems   Constitutional: Negative for chills, fatigue and fever.   HENT: Negative for congestion, ear pain, nosebleeds, postnasal drip, rhinorrhea, sinus pressure and sore throat.    Eyes: Negative for photophobia and pain.   Respiratory: Negative for apnea, cough, choking, chest tightness, shortness of breath, wheezing and stridor.    Cardiovascular: Negative for chest pain, palpitations and leg swelling.   Gastrointestinal: Negative for abdominal pain, constipation, diarrhea, nausea and vomiting.   Genitourinary: Positive for dysuria and frequency. Negative for flank pain, genital sores, hematuria, pelvic pain, vaginal bleeding, vaginal discharge and vaginal  pain.   Musculoskeletal: Negative for arthralgias, back pain, gait problem and neck pain.   Skin: Negative for color change, pallor, rash and wound.   Neurological: Negative for dizziness, seizures, syncope, facial asymmetry, light-headedness and headaches.   Hematological: Negative for adenopathy.   Psychiatric/Behavioral: Negative for confusion. The patient is not nervous/anxious.        Physical Exam     Initial Vitals [10/15/20 1503]   BP Pulse Resp Temp SpO2   (!) 144/91 74 20 97.4 °F (36.3 °C) 100 %      MAP       --         Physical Exam    Nursing note and vitals reviewed.  Constitutional: She appears well-developed and well-nourished. She is not diaphoretic. She is active and cooperative.  Non-toxic appearance. She does not have a sickly appearance. She does not appear ill. No distress.   HENT:   Head: Normocephalic and atraumatic.   Right Ear: External ear normal.   Left Ear: External ear normal.   Nose: Nose normal.   Eyes: Conjunctivae and EOM are normal. Right eye exhibits no discharge. Left eye exhibits no discharge.   Neck: Normal range of motion. Neck supple. No tracheal deviation present.   Cardiovascular: Normal rate.   Pulmonary/Chest: No stridor. No respiratory distress.   Abdominal: Soft. Normal appearance. She exhibits no distension. There is no abdominal tenderness. There is no rigidity, no rebound, no guarding, no CVA tenderness, no tenderness at McBurney's point and negative Sorto's sign.   No abdominal tenderness to palpation.  No CVA tenderness bilaterally.  Abdomen is soft without rigidity or guarding.   Musculoskeletal: Normal range of motion. No tenderness.   Neurological: She is alert and oriented to person, place, and time. She has normal strength. No cranial nerve deficit or sensory deficit.   Skin: Skin is warm and dry.   Psychiatric: She has a normal mood and affect. Her behavior is normal. Judgment and thought content normal.         ED Course   Procedures  Labs Reviewed   POCT  URINALYSIS W/O SCOPE - Abnormal; Notable for the following components:       Result Value    Bilirubin, UA 1+ (*)     Spec Grav UA >=1.030 (*)     Blood, UA Trace-lysed (*)     Protein, UA 1+ (*)     Leukocytes, UA 1+ (*)     All other components within normal limits   CULTURE, URINE   POCT URINE PREGNANCY   POCT URINALYSIS W/O SCOPE          Imaging Results    None          Medical Decision Making:   History:   Old Medical Records: I decided to obtain old medical records.  Differential Diagnosis:   Cystitis, pyelonephritis, obstructive uropathy, BV, candidal vaginitis, STD, others  Clinical Tests:   Lab Tests: Ordered and Reviewed  ED Management:  HPI and physical exam as above.    Patient with dysuria and urinary frequency consistent with her past UTIs.  Urinalysis with evidence of infection.  No fevers, abdominal pain, or CVA tenderness.  No evidence of systemic infection/sepsis.  No evidence of pyelonephritis.  Urine culture in process.  Treated with Rocephin in the ED.  Will discharge on Keflex.  Patient is tolerating p.o. without difficulty and is very well-appearing and in no distress prior to discharge. Advised patient to follow up with her PCP for re-evaluation and further management.  ED return precautions given. All questions regarding diagnosis and plan were answered to the patient's fullest possible satisfaction. Patient expressed understanding of diagnosis, discharge instructions, and return precautions.            Patient note was created using [employer] voice dictation software.  Any errors in syntax or information may not have been identified and edited prior to signing this note.                               Clinical Impression:     ICD-10-CM ICD-9-CM   1. Urinary tract infection with hematuria, site unspecified  N39.0 599.0    R31.9 599.70                      Disposition:   Disposition: Discharged  Condition: Stable     ED Disposition Condition    Discharge Stable        ED Prescriptions      Medication Sig Dispense Start Date End Date Auth. Provider    cephALEXin (KEFLEX) 500 MG capsule Take 1 capsule (500 mg total) by mouth every 12 (twelve) hours. for 7 days 14 capsule 10/15/2020 10/22/2020 Lucio Tsang NP        Follow-up Information     Follow up With Specialties Details Why Contact Info    [facility] [place]  Schedule an appointment as soon as possible for a visit in 1 week For further evaluation [address] [place] [address]  [phone]      [facility] Emergency Department Emergency Medicine Go to  If symptoms worsen, As needed [address] [facility] [id number] [phone]                                       Lucio Tsang NP  10/15/20 1527

## 2020-10-17 LAB — BACTERIA UR CULT: ABNORMAL

## 2020-10-27 ENCOUNTER — HOSPITAL ENCOUNTER (EMERGENCY)
Facility: HOSPITAL | Age: 40
Discharge: HOME | End: 2020-10-27
Attending: EMERGENCY MEDICINE
Payer: MEDICAID

## 2020-10-27 VITALS
TEMPERATURE: 98 F | HEIGHT: 59 IN | RESPIRATION RATE: 18 BRPM | WEIGHT: 115 LBS | HEART RATE: 64 BPM | OXYGEN SATURATION: 100 % | BODY MASS INDEX: 23.18 KG/M2 | SYSTOLIC BLOOD PRESSURE: 125 MMHG | DIASTOLIC BLOOD PRESSURE: 73 MMHG

## 2020-10-27 DIAGNOSIS — R30.0 DYSURIA: Primary | ICD-10-CM

## 2020-10-27 LAB
B-HCG UR QL: NEGATIVE
BILIRUBIN, POC UA: ABNORMAL
BLOOD, POC UA: ABNORMAL
CLARITY, POC UA: ABNORMAL
COLOR, POC UA: ABNORMAL
CTP QC/QA: YES
GLUCOSE, POC UA: NEGATIVE
KETONES, POC UA: ABNORMAL
LEUKOCYTE EST, POC UA: ABNORMAL
NITRITE, POC UA: POSITIVE
PH UR STRIP: 7 [PH]
PROTEIN, POC UA: ABNORMAL
SPECIFIC GRAVITY, POC UA: 1.02
UROBILINOGEN, POC UA: >=8 E.U./DL

## 2020-10-27 PROCEDURE — 87086 URINE CULTURE/COLONY COUNT: CPT

## 2020-10-27 PROCEDURE — 87077 CULTURE AEROBIC IDENTIFY: CPT

## 2020-10-27 PROCEDURE — 81003 URINALYSIS AUTO W/O SCOPE: CPT | Mod: ER

## 2020-10-27 PROCEDURE — 87088 URINE BACTERIA CULTURE: CPT

## 2020-10-27 PROCEDURE — 87186 SC STD MICRODIL/AGAR DIL: CPT

## 2020-10-27 PROCEDURE — 99283 EMERGENCY DEPT VISIT LOW MDM: CPT | Mod: 25,ER

## 2020-10-27 PROCEDURE — 81025 URINE PREGNANCY TEST: CPT | Mod: ER | Performed by: EMERGENCY MEDICINE

## 2020-10-27 RX ORDER — NITROFURANTOIN 25; 75 MG/1; MG/1
100 CAPSULE ORAL 2 TIMES DAILY
Qty: 20 CAPSULE | Refills: 0 | Status: SHIPPED | OUTPATIENT
Start: 2020-10-27 | End: 2020-11-06

## 2020-10-27 NOTE — DISCHARGE INSTRUCTIONS
Thank you for coming to our Emergency Department today. It is important to remember that some problems are difficult to diagnose and may not be found during your first visit. Be sure to follow up with your primary care doctor and review any labs/imaging that was performed with them. If you do not have a primary care doctor, you may contact the one listed on your discharge paperwork or you may also call the [facility] Appointment Desk at [phone] to schedule an appointment with one.     All medications may potentially have side effects and it is impossible to predict which medications may give you side effects. If you feel that you are having a negative effect of any medication you should immediately stop taking them and seek medical attention.    Return to the ER with any questions/concerns, new/concerning symptoms, worsening or failure to improve. Do not drive or make any important decisions for 24 hours if you have received any pain medications, sedatives or mood altering drugs during your ER visit.

## 2020-10-27 NOTE — ED PROVIDER NOTES
Encounter Date: 10/27/2020       History     Chief Complaint   Patient presents with    Dysuria     Pt to ER with c/o burning when urinating. Pt reports compleated abx course for UTI last week      40 y.o. female No past medical history on file.     Presents for evaluation of dysuria, urgency, frequency and foul smelling urine. Denies f/c, n/v, diarrhea or other complaints.        Review of patient's allergies indicates:  No Known Allergies  No past medical history on file.  Past Surgical History:   Procedure Laterality Date    tubaligation       No family history on file.  Social History     Tobacco Use    Smoking status: Current Every Day Smoker     Packs/day: 0.50    Smokeless tobacco: Never Used    Tobacco comment: 2-3 cigs daily   Substance Use Topics    Alcohol use: No     Frequency: Never    Drug use: No     Review of Systems   Constitutional: Negative for fever.   HENT: Negative for sore throat.    Respiratory: Negative for shortness of breath.    Cardiovascular: Negative for chest pain.   Gastrointestinal: Negative for nausea.   Genitourinary: Positive for dysuria.   Musculoskeletal: Negative for back pain.   Skin: Negative for rash.   Neurological: Negative for weakness.   Hematological: Does not bruise/bleed easily.   All other systems reviewed and are negative.      Physical Exam     Initial Vitals [10/27/20 1730]   BP Pulse Resp Temp SpO2   125/73 64 18 97.9 °F (36.6 °C) 100 %      MAP       --         Physical Exam    Nursing note and vitals reviewed.  Constitutional: She appears well-developed and well-nourished.   HENT:   Head: Normocephalic and atraumatic.   Eyes: Conjunctivae and EOM are normal. Pupils are equal, round, and reactive to light.   Neck: Normal range of motion.   Cardiovascular: Normal rate and regular rhythm.   Pulmonary/Chest: Breath sounds normal. No respiratory distress.   Abdominal: She exhibits no distension.   Musculoskeletal: Normal range of motion.   Neurological: She  is alert. No cranial nerve deficit. GCS score is 15. GCS eye subscore is 4. GCS verbal subscore is 5. GCS motor subscore is 6.   Skin: Skin is warm and dry.   Psychiatric: She has a normal mood and affect. Thought content normal.         ED Course   Procedures  Labs Reviewed   POCT URINALYSIS W/O SCOPE - Abnormal; Notable for the following components:       Result Value    Bilirubin, UA 1+ (*)     Ketones, UA 2+ (*)     Blood, UA 2+ (*)     Protein, UA 2+ (*)     Urobilinogen, UA >=8.0 (*)     Nitrite, UA Positive (*)     Leukocytes, UA 3+ (*)     All other components within normal limits   CULTURE, URINE   POCT URINALYSIS W/O SCOPE   POCT URINE PREGNANCY          Imaging Results    None                             Pt was on keflex, will start on macrobid x 14 days.         Clinical Impression:     ICD-10-CM ICD-9-CM   1. Dysuria  R30.0 788.1                          ED Disposition Condition    Discharge Stable        ED Prescriptions     Medication Sig Dispense Start Date End Date Auth. Provider    nitrofurantoin, macrocrystal-monohydrate, (MACROBID) 100 MG capsule Take 1 capsule (100 mg total) by mouth 2 (two) times daily. for 10 days 20 capsule 10/27/2020 11/6/2020 Kory Steele MD        Follow-up Information     Follow up With Specialties Details Why Contact Info    [facility]    [address] [place] [address]  [phone]                                         Kory Steele MD  10/27/20 2858

## 2020-10-30 LAB — BACTERIA UR CULT: ABNORMAL

## 2020-12-19 ENCOUNTER — HOSPITAL ENCOUNTER (EMERGENCY)
Facility: HOSPITAL | Age: 40
Discharge: HOME | End: 2020-12-19
Attending: EMERGENCY MEDICINE
Payer: MEDICAID

## 2020-12-19 VITALS
SYSTOLIC BLOOD PRESSURE: 130 MMHG | RESPIRATION RATE: 16 BRPM | DIASTOLIC BLOOD PRESSURE: 92 MMHG | HEART RATE: 88 BPM | OXYGEN SATURATION: 99 % | HEIGHT: 59 IN | BODY MASS INDEX: 23.39 KG/M2 | WEIGHT: 116 LBS | TEMPERATURE: 98 F

## 2020-12-19 DIAGNOSIS — N39.0 URINARY TRACT INFECTION WITHOUT HEMATURIA, SITE UNSPECIFIED: Primary | ICD-10-CM

## 2020-12-19 LAB
B-HCG UR QL: NEGATIVE
BILIRUBIN, POC UA: NEGATIVE
BLOOD, POC UA: ABNORMAL
CLARITY, POC UA: ABNORMAL
COLOR, POC UA: YELLOW
CTP QC/QA: YES
GLUCOSE, POC UA: NEGATIVE
KETONES, POC UA: NEGATIVE
LEUKOCYTE EST, POC UA: ABNORMAL
NITRITE, POC UA: NEGATIVE
PH UR STRIP: 6.5 [PH]
PROTEIN, POC UA: ABNORMAL
SPECIFIC GRAVITY, POC UA: 1.02
UROBILINOGEN, POC UA: 0.2 E.U./DL

## 2020-12-19 PROCEDURE — 99283 EMERGENCY DEPT VISIT LOW MDM: CPT | Mod: 25,ER

## 2020-12-19 PROCEDURE — 81025 URINE PREGNANCY TEST: CPT | Mod: ER | Performed by: EMERGENCY MEDICINE

## 2020-12-19 PROCEDURE — 87088 URINE BACTERIA CULTURE: CPT

## 2020-12-19 PROCEDURE — 87186 SC STD MICRODIL/AGAR DIL: CPT

## 2020-12-19 PROCEDURE — 81003 URINALYSIS AUTO W/O SCOPE: CPT | Mod: ER

## 2020-12-19 PROCEDURE — 87077 CULTURE AEROBIC IDENTIFY: CPT

## 2020-12-19 PROCEDURE — 87086 URINE CULTURE/COLONY COUNT: CPT

## 2020-12-19 PROCEDURE — 87491 CHLMYD TRACH DNA AMP PROBE: CPT

## 2020-12-19 RX ORDER — NITROFURANTOIN 25; 75 MG/1; MG/1
100 CAPSULE ORAL 2 TIMES DAILY
Qty: 20 CAPSULE | Refills: 0 | Status: SHIPPED | OUTPATIENT
Start: 2020-12-19 | End: 2020-12-29

## 2020-12-19 NOTE — DISCHARGE INSTRUCTIONS
Thank you for coming to our Emergency Department today. It is important to remember that some problems are difficult to diagnose and may not be found during your first visit. Be sure to follow up with your primary care doctor and review any labs/imaging that was performed with them. If you do not have a primary care doctor, you may contact the one listed on your discharge paperwork or you may also call the [facility] Appointment Desk at [phone] to schedule an appointment with one.     All medications may potentially have side effects and it is impossible to predict which medications may give you side effects. If you feel that you are having a negative effect of any medication you should immediately stop taking them and seek medical attention.    Return to the ER with any questions/concerns, new/concerning symptoms, worsening or failure to improve. Do not drive or make any important decisions for 24 hours if you have received any pain medications, sedatives or mood altering drugs during your ER visit.

## 2020-12-19 NOTE — ED PROVIDER NOTES
Encounter Date: 12/19/2020       History     Chief Complaint   Patient presents with    Urinary Tract Infection     urgency and frequency started yesterday.      40 y.o. female No past medical history on file.     Notes that she has had 2 UTIs this year feels that she is starting to have urinary frequency and thinks that she is starting to get a UTI notes that she did have recent intercourse but she has started insisting that her partner wash beforehand denies rectal followed by vaginal intercourse.  Denies vaginal discharge no fevers chills nausea vomiting diarrhea states she has not seen a urologist        Review of patient's allergies indicates:  No Known Allergies  No past medical history on file.  Past Surgical History:   Procedure Laterality Date    tubaligation       No family history on file.  Social History     Tobacco Use    Smoking status: Current Every Day Smoker     Packs/day: 0.50    Smokeless tobacco: Never Used    Tobacco comment: 2-3 cigs daily   Substance Use Topics    Alcohol use: No     Frequency: Never    Drug use: No     Review of Systems   Constitutional: Negative for fever.   HENT: Negative for sore throat.    Respiratory: Negative for shortness of breath.    Cardiovascular: Negative for chest pain.   Gastrointestinal: Negative for nausea.   Genitourinary: Negative for dysuria.   Musculoskeletal: Negative for back pain.   Skin: Negative for rash.   Neurological: Negative for weakness.   Hematological: Does not bruise/bleed easily.   All other systems reviewed and are negative.      Physical Exam     Initial Vitals [12/19/20 1418]   BP Pulse Resp Temp SpO2   (!) 130/92 88 16 98.3 °F (36.8 °C) 99 %      MAP       --         Physical Exam    Nursing note and vitals reviewed.  Constitutional: She appears well-developed and well-nourished.   HENT:   Head: Normocephalic and atraumatic.   Eyes: Conjunctivae and EOM are normal. Pupils are equal, round, and reactive to light.   Neck: Normal  range of motion.   Cardiovascular: Normal rate.   Pulmonary/Chest: No respiratory distress.   Abdominal: She exhibits no distension.   Musculoskeletal: Normal range of motion.   Neurological: She is alert. No cranial nerve deficit. GCS score is 15. GCS eye subscore is 4. GCS verbal subscore is 5. GCS motor subscore is 6.   Skin: Skin is warm and dry.   Psychiatric: She has a normal mood and affect. Thought content normal.         ED Course   Procedures  Labs Reviewed   POCT URINALYSIS W/O SCOPE - Abnormal; Notable for the following components:       Result Value    Blood, UA 3+ (*)     Protein, UA 1+ (*)     Leukocytes, UA 3+ (*)     All other components within normal limits   CULTURE, URINE   C. TRACHOMATIS/N. GONORRHOEAE BY AMP DNA   POCT URINE PREGNANCY   POCT URINALYSIS W/O SCOPE          Imaging Results    None                           I have discussed with patient strategies to make sure that her partner washes each time before intercourse and I have discussed with her that given that this is her 3rd UTI this year she should follow-up with her primary if this continues or if she gets another 1 she may benefit from a urology referral I have reviewed prior UTIs patient grows pansensitive E coli will start patient on Macrobid will do an extended course of Macrobid given that this is a recurring issue      Have also sent urine gc/chlam given +leuks but no nitrites. Microscopy not available at free standing.         Clinical Impression:     ICD-10-CM ICD-9-CM   1. Urinary tract infection without hematuria, site unspecified  N39.0 599.0                          ED Disposition Condition    Discharge Stable        ED Prescriptions     None        Follow-up Information    None                                      Kory Steele MD  12/19/20 5033

## 2020-12-21 LAB
BACTERIA UR CULT: ABNORMAL
C TRACH DNA SPEC QL NAA+PROBE: NOT DETECTED
N GONORRHOEA DNA SPEC QL NAA+PROBE: NOT DETECTED

## 2021-06-15 ENCOUNTER — HOSPITAL ENCOUNTER (EMERGENCY)
Facility: HOSPITAL | Age: 41
Discharge: HOME | End: 2021-06-15
Attending: EMERGENCY MEDICINE
Payer: MEDICAID

## 2021-06-15 VITALS
TEMPERATURE: 99 F | WEIGHT: 110 LBS | DIASTOLIC BLOOD PRESSURE: 78 MMHG | HEIGHT: 58 IN | BODY MASS INDEX: 23.09 KG/M2 | SYSTOLIC BLOOD PRESSURE: 130 MMHG | HEART RATE: 68 BPM | RESPIRATION RATE: 18 BRPM | OXYGEN SATURATION: 100 %

## 2021-06-15 DIAGNOSIS — L50.9 URTICARIA: Primary | ICD-10-CM

## 2021-06-15 LAB
B-HCG UR QL: NEGATIVE
CTP QC/QA: YES

## 2021-06-15 PROCEDURE — 96372 THER/PROPH/DIAG INJ SC/IM: CPT | Mod: ER

## 2021-06-15 PROCEDURE — 99284 EMERGENCY DEPT VISIT MOD MDM: CPT | Mod: 25,ER

## 2021-06-15 PROCEDURE — 81025 URINE PREGNANCY TEST: CPT | Mod: ER | Performed by: EMERGENCY MEDICINE

## 2021-06-15 PROCEDURE — 63600175 PHARM REV CODE 636 W HCPCS: Mod: ER | Performed by: EMERGENCY MEDICINE

## 2021-06-15 RX ORDER — PREDNISONE 10 MG/1
10 TABLET ORAL DAILY
Qty: 5 TABLET | Refills: 0 | Status: SHIPPED | OUTPATIENT
Start: 2021-06-15 | End: 2021-06-20

## 2021-06-15 RX ORDER — METHYLPREDNISOLONE SOD SUCC 125 MG
80 VIAL (EA) INJECTION
Status: COMPLETED | OUTPATIENT
Start: 2021-06-15 | End: 2021-06-15

## 2021-06-15 RX ADMIN — METHYLPREDNISOLONE SODIUM SUCCINATE 80 MG: 125 INJECTION, POWDER, FOR SOLUTION INTRAMUSCULAR; INTRAVENOUS at 07:06

## 2021-07-01 ENCOUNTER — PATIENT MESSAGE (OUTPATIENT)
Dept: ADMINISTRATIVE | Facility: OTHER | Age: 41
End: 2021-07-01

## 2021-08-09 ENCOUNTER — HOSPITAL ENCOUNTER (EMERGENCY)
Facility: HOSPITAL | Age: 41
Discharge: HOME | End: 2021-08-09
Attending: INTERNAL MEDICINE
Payer: MEDICAID

## 2021-08-09 VITALS
HEIGHT: 58 IN | TEMPERATURE: 99 F | DIASTOLIC BLOOD PRESSURE: 92 MMHG | SYSTOLIC BLOOD PRESSURE: 129 MMHG | OXYGEN SATURATION: 96 % | HEART RATE: 82 BPM | RESPIRATION RATE: 18 BRPM | BODY MASS INDEX: 22.99 KG/M2

## 2021-08-09 DIAGNOSIS — N30.01 ACUTE CYSTITIS WITH HEMATURIA: Primary | ICD-10-CM

## 2021-08-09 LAB
B-HCG UR QL: NEGATIVE
BACTERIA GENITAL QL WET PREP: ABNORMAL
BILIRUB UR QL STRIP: NEGATIVE
CLARITY UR: CLEAR
CLUE CELLS VAG QL WET PREP: ABNORMAL
COLOR UR: YELLOW
CTP QC/QA: YES
FILAMENT FUNGI VAG WET PREP-#/AREA: ABNORMAL
GLUCOSE UR QL STRIP: NEGATIVE
HGB UR QL STRIP: NEGATIVE
KETONES UR QL STRIP: ABNORMAL
LEUKOCYTE ESTERASE UR QL STRIP: ABNORMAL
MICROSCOPIC COMMENT: ABNORMAL
NITRITE UR QL STRIP: NEGATIVE
PH UR STRIP: 7 [PH] (ref 5–8)
PROT UR QL STRIP: ABNORMAL
RBC #/AREA URNS HPF: 2 /HPF (ref 0–4)
SP GR UR STRIP: 1.02 (ref 1–1.03)
SPECIMEN SOURCE: ABNORMAL
SQUAMOUS #/AREA URNS HPF: 1 /HPF
T VAGINALIS GENITAL QL WET PREP: ABNORMAL
URN SPEC COLLECT METH UR: ABNORMAL
UROBILINOGEN UR STRIP-ACNC: NEGATIVE EU/DL
WBC #/AREA URNS HPF: 88 /HPF (ref 0–5)
WBC #/AREA VAG WET PREP: ABNORMAL
YEAST GENITAL QL WET PREP: ABNORMAL

## 2021-08-09 PROCEDURE — 63600175 PHARM REV CODE 636 W HCPCS: Performed by: PHYSICIAN ASSISTANT

## 2021-08-09 PROCEDURE — 87086 URINE CULTURE/COLONY COUNT: CPT | Performed by: PHYSICIAN ASSISTANT

## 2021-08-09 PROCEDURE — 87491 CHLMYD TRACH DNA AMP PROBE: CPT | Performed by: PHYSICIAN ASSISTANT

## 2021-08-09 PROCEDURE — 99284 EMERGENCY DEPT VISIT MOD MDM: CPT | Mod: ,,, | Performed by: INTERNAL MEDICINE

## 2021-08-09 PROCEDURE — 99284 PR EMERGENCY DEPT VISIT,LEVEL IV: ICD-10-PCS | Mod: ,,, | Performed by: INTERNAL MEDICINE

## 2021-08-09 PROCEDURE — 99284 EMERGENCY DEPT VISIT MOD MDM: CPT | Mod: 25

## 2021-08-09 PROCEDURE — 96372 THER/PROPH/DIAG INJ SC/IM: CPT

## 2021-08-09 PROCEDURE — 87210 SMEAR WET MOUNT SALINE/INK: CPT | Performed by: PHYSICIAN ASSISTANT

## 2021-08-09 PROCEDURE — 25000003 PHARM REV CODE 250: Performed by: PHYSICIAN ASSISTANT

## 2021-08-09 PROCEDURE — 81025 URINE PREGNANCY TEST: CPT | Performed by: PHYSICIAN ASSISTANT

## 2021-08-09 PROCEDURE — 81000 URINALYSIS NONAUTO W/SCOPE: CPT | Performed by: PHYSICIAN ASSISTANT

## 2021-08-09 PROCEDURE — 87591 N.GONORRHOEAE DNA AMP PROB: CPT | Performed by: PHYSICIAN ASSISTANT

## 2021-08-09 RX ORDER — ACETAMINOPHEN 500 MG
500 TABLET ORAL EVERY 4 HOURS PRN
Qty: 20 TABLET | Refills: 0 | Status: SHIPPED | OUTPATIENT
Start: 2021-08-09 | End: 2021-08-14

## 2021-08-09 RX ORDER — DOXYCYCLINE 100 MG/1
100 CAPSULE ORAL EVERY 12 HOURS
Qty: 14 CAPSULE | Refills: 0 | Status: SHIPPED | OUTPATIENT
Start: 2021-08-09 | End: 2021-08-16

## 2021-08-09 RX ORDER — CYCLOBENZAPRINE HCL 10 MG
10 TABLET ORAL 3 TIMES DAILY PRN
Qty: 20 TABLET | Refills: 0 | Status: SHIPPED | OUTPATIENT
Start: 2021-08-09 | End: 2021-08-16

## 2021-08-09 RX ORDER — DOXYCYCLINE HYCLATE 100 MG
100 TABLET ORAL
Status: COMPLETED | OUTPATIENT
Start: 2021-08-09 | End: 2021-08-09

## 2021-08-09 RX ORDER — PHENAZOPYRIDINE HYDROCHLORIDE 200 MG/1
200 TABLET, FILM COATED ORAL
Qty: 6 TABLET | Refills: 0 | Status: SHIPPED | OUTPATIENT
Start: 2021-08-09 | End: 2021-08-11

## 2021-08-09 RX ORDER — IBUPROFEN 600 MG/1
600 TABLET ORAL EVERY 6 HOURS PRN
Qty: 20 TABLET | Refills: 0 | Status: SHIPPED | OUTPATIENT
Start: 2021-08-09 | End: 2021-08-14

## 2021-08-09 RX ORDER — KETOROLAC TROMETHAMINE 30 MG/ML
30 INJECTION, SOLUTION INTRAMUSCULAR; INTRAVENOUS
Status: COMPLETED | OUTPATIENT
Start: 2021-08-09 | End: 2021-08-09

## 2021-08-09 RX ORDER — CEFTRIAXONE 500 MG/1
500 INJECTION, POWDER, FOR SOLUTION INTRAMUSCULAR; INTRAVENOUS
Status: COMPLETED | OUTPATIENT
Start: 2021-08-09 | End: 2021-08-09

## 2021-08-09 RX ORDER — ONDANSETRON 4 MG/1
4 TABLET, ORALLY DISINTEGRATING ORAL EVERY 6 HOURS PRN
Qty: 15 TABLET | Refills: 0 | Status: SHIPPED | OUTPATIENT
Start: 2021-08-09 | End: 2021-08-14

## 2021-08-09 RX ADMIN — KETOROLAC TROMETHAMINE 30 MG: 30 INJECTION, SOLUTION INTRAMUSCULAR; INTRAVENOUS at 02:08

## 2021-08-09 RX ADMIN — CEFTRIAXONE SODIUM 500 MG: 500 INJECTION, POWDER, FOR SOLUTION INTRAMUSCULAR; INTRAVENOUS at 02:08

## 2021-08-09 RX ADMIN — DOXYCYCLINE HYCLATE 100 MG: 100 TABLET, COATED ORAL at 02:08

## 2021-08-10 LAB
C TRACH DNA SPEC QL NAA+PROBE: NOT DETECTED
N GONORRHOEA DNA SPEC QL NAA+PROBE: DETECTED

## 2021-08-11 LAB — BACTERIA UR CULT: NORMAL

## 2021-10-11 ENCOUNTER — HOSPITAL ENCOUNTER (EMERGENCY)
Facility: HOSPITAL | Age: 41
Discharge: HOME | End: 2021-10-11
Attending: EMERGENCY MEDICINE
Payer: MEDICAID

## 2021-10-11 VITALS
HEART RATE: 89 BPM | BODY MASS INDEX: 23.09 KG/M2 | DIASTOLIC BLOOD PRESSURE: 92 MMHG | WEIGHT: 110 LBS | HEIGHT: 58 IN | RESPIRATION RATE: 17 BRPM | TEMPERATURE: 98 F | SYSTOLIC BLOOD PRESSURE: 128 MMHG | OXYGEN SATURATION: 100 %

## 2021-10-11 DIAGNOSIS — N30.01 ACUTE CYSTITIS WITH HEMATURIA: Primary | ICD-10-CM

## 2021-10-11 DIAGNOSIS — A54.9 GONORRHEA: ICD-10-CM

## 2021-10-11 LAB
B-HCG UR QL: NEGATIVE
BILIRUBIN, POC UA: NEGATIVE
BLOOD, POC UA: ABNORMAL
CLARITY, POC UA: CLEAR
COLOR, POC UA: YELLOW
CTP QC/QA: YES
GLUCOSE, POC UA: NEGATIVE
KETONES, POC UA: ABNORMAL
LEUKOCYTE EST, POC UA: ABNORMAL
NITRITE, POC UA: POSITIVE
PH UR STRIP: 6 [PH]
PROTEIN, POC UA: ABNORMAL
SPECIFIC GRAVITY, POC UA: >=1.03
UROBILINOGEN, POC UA: 0.2 E.U./DL

## 2021-10-11 PROCEDURE — 63600175 PHARM REV CODE 636 W HCPCS: Mod: ER | Performed by: EMERGENCY MEDICINE

## 2021-10-11 PROCEDURE — 81025 URINE PREGNANCY TEST: CPT | Mod: ER | Performed by: EMERGENCY MEDICINE

## 2021-10-11 PROCEDURE — 87086 URINE CULTURE/COLONY COUNT: CPT | Performed by: EMERGENCY MEDICINE

## 2021-10-11 PROCEDURE — 87077 CULTURE AEROBIC IDENTIFY: CPT | Performed by: EMERGENCY MEDICINE

## 2021-10-11 PROCEDURE — 99284 EMERGENCY DEPT VISIT MOD MDM: CPT | Mod: 25,ER

## 2021-10-11 PROCEDURE — 87186 SC STD MICRODIL/AGAR DIL: CPT | Performed by: EMERGENCY MEDICINE

## 2021-10-11 PROCEDURE — 87088 URINE BACTERIA CULTURE: CPT | Performed by: EMERGENCY MEDICINE

## 2021-10-11 RX ORDER — PHENAZOPYRIDINE HYDROCHLORIDE 200 MG/1
200 TABLET, FILM COATED ORAL 3 TIMES DAILY
Qty: 6 TABLET | Refills: 0 | Status: SHIPPED | OUTPATIENT
Start: 2021-10-11 | End: 2021-10-13

## 2021-10-11 RX ORDER — IBUPROFEN 600 MG/1
600 TABLET ORAL EVERY 6 HOURS PRN
Qty: 20 TABLET | Refills: 0 | Status: SHIPPED | OUTPATIENT
Start: 2021-10-11

## 2021-10-11 RX ORDER — NITROFURANTOIN 25; 75 MG/1; MG/1
100 CAPSULE ORAL 2 TIMES DAILY
Qty: 10 CAPSULE | Refills: 0 | Status: SHIPPED | OUTPATIENT
Start: 2021-10-11 | End: 2021-10-16

## 2021-10-11 RX ORDER — DEXTROMETHORPHAN HYDROBROMIDE, GUAIFENESIN 5; 100 MG/5ML; MG/5ML
650 LIQUID ORAL EVERY 8 HOURS
Qty: 20 TABLET | Refills: 0 | Status: SHIPPED | OUTPATIENT
Start: 2021-10-11

## 2021-10-11 RX ORDER — CEFTRIAXONE 1 G/1
1 INJECTION, POWDER, FOR SOLUTION INTRAMUSCULAR; INTRAVENOUS
Status: COMPLETED | OUTPATIENT
Start: 2021-10-11 | End: 2021-10-11

## 2021-10-11 RX ADMIN — CEFTRIAXONE SODIUM 1 G: 1 INJECTION, POWDER, FOR SOLUTION INTRAMUSCULAR; INTRAVENOUS at 04:10

## 2021-10-13 LAB — BACTERIA UR CULT: ABNORMAL

## 2022-01-25 NOTE — ED TRIAGE NOTES
Pt presents to the ER with c/o burning with urination, lower flank pain and urinary urgency x 1 week. Pt denies N/V/D or fevers.  
negative

## 2022-03-08 ENCOUNTER — HOSPITAL ENCOUNTER (EMERGENCY)
Facility: HOSPITAL | Age: 42
Discharge: LEFT BEFORE DISCHARGE | End: 2022-03-08
Payer: MEDICAID

## 2022-03-08 VITALS
SYSTOLIC BLOOD PRESSURE: 129 MMHG | RESPIRATION RATE: 20 BRPM | HEIGHT: 59 IN | WEIGHT: 116 LBS | OXYGEN SATURATION: 97 % | BODY MASS INDEX: 23.39 KG/M2 | TEMPERATURE: 99 F | HEART RATE: 103 BPM | DIASTOLIC BLOOD PRESSURE: 79 MMHG

## 2022-03-08 LAB
B-HCG UR QL: NEGATIVE
CTP QC/QA: YES

## 2022-03-08 PROCEDURE — 99900041 HC LEFT WITHOUT BEING SEEN- EMERGENCY: Mod: ER

## 2022-03-08 PROCEDURE — 81025 URINE PREGNANCY TEST: CPT | Mod: ER | Performed by: EMERGENCY MEDICINE

## 2023-01-19 ENCOUNTER — HOSPITAL ENCOUNTER (EMERGENCY)
Facility: HOSPITAL | Age: 43
Discharge: HOME | End: 2023-01-20
Attending: EMERGENCY MEDICINE
Payer: MEDICAID

## 2023-01-19 VITALS
WEIGHT: 118 LBS | OXYGEN SATURATION: 99 % | SYSTOLIC BLOOD PRESSURE: 113 MMHG | HEART RATE: 54 BPM | DIASTOLIC BLOOD PRESSURE: 67 MMHG | BODY MASS INDEX: 23.83 KG/M2 | TEMPERATURE: 98 F | RESPIRATION RATE: 18 BRPM

## 2023-01-19 DIAGNOSIS — U07.1 COVID-19: Primary | ICD-10-CM

## 2023-01-19 LAB
CTP QC/QA: YES
SARS-COV-2 RDRP RESP QL NAA+PROBE: POSITIVE

## 2023-01-19 PROCEDURE — 99284 EMERGENCY DEPT VISIT MOD MDM: CPT

## 2023-01-19 PROCEDURE — 87635 SARS-COV-2 COVID-19 AMP PRB: CPT | Performed by: EMERGENCY MEDICINE

## 2023-01-19 NOTE — Clinical Note
"Von "Von" Rony was seen and treated in our emergency department on 1/19/2023.  She may return to work on 01/25/2023.  Clear to end quarantine on 01/25/2023.  Please wear a mask for another 5 days.     If you have any questions or concerns, please don't hesitate to call.      ELIAN Szymanski"

## 2023-01-20 RX ORDER — FLUTICASONE PROPIONATE 50 MCG
2 SPRAY, SUSPENSION (ML) NASAL EVERY 12 HOURS
Qty: 9.9 ML | Refills: 0 | Status: SHIPPED | OUTPATIENT
Start: 2023-01-20 | End: 2023-02-03

## 2023-01-20 RX ORDER — BENZONATATE 100 MG/1
100 CAPSULE ORAL EVERY 8 HOURS PRN
Qty: 15 CAPSULE | Refills: 0 | Status: SHIPPED | OUTPATIENT
Start: 2023-01-20 | End: 2023-01-30

## 2023-01-20 NOTE — ED PROVIDER NOTES
Encounter Date: 1/19/2023       History     Chief Complaint   Patient presents with    covid test     Needs clearance for work     CC:  Fever, headache, chills    HPI: Von Uribe, a 42 y.o. female presents to the ED with a 1 day history of fever, generalized headache, chills, cough congestion.  Reports sick contact from family previously.  Patient reports no shortness breath or difficulty breathing or swallowing.  Reports cough yesterday but has seemed to improved today.  Patient does have Zyrtec that she takes intermittently at home.    Patient Active Problem List:     Acute cystitis with hematuria     Acute pyelonephritis     Sciatica of left side     Viral gastroenteritis     Nausea        The history is provided by the patient. No  was used.   Review of patient's allergies indicates:  No Known Allergies  Past Medical History:   Diagnosis Date    Uterine fibroid      Past Surgical History:   Procedure Laterality Date    tubaligation       No family history on file.  Social History     Tobacco Use    Smoking status: Every Day     Packs/day: 0.50     Types: Cigarettes    Smokeless tobacco: Never    Tobacco comments:     2-3 cigs daily   Substance Use Topics    Alcohol use: No    Drug use: Yes     Types: Marijuana     Review of Systems   Constitutional:  Positive for chills and fever.   HENT:  Positive for congestion and rhinorrhea. Negative for ear discharge, ear pain, sore throat and trouble swallowing.    Respiratory:  Positive for cough. Negative for shortness of breath.    Cardiovascular:  Negative for chest pain and leg swelling.   Gastrointestinal:  Negative for abdominal pain, diarrhea, nausea and vomiting.   Genitourinary:  Negative for dysuria.   Musculoskeletal:  Negative for back pain, neck pain and neck stiffness.   Skin:  Negative for color change, rash and wound.   Neurological:  Positive for headaches. Negative for syncope and weakness.   Psychiatric/Behavioral:   Negative for confusion.      Physical Exam     Initial Vitals [01/19/23 2313]   BP Pulse Resp Temp SpO2   113/67 (!) 54 18 98.2 °F (36.8 °C) 99 %      MAP       --         Physical Exam    Nursing note and vitals reviewed.  Constitutional: She appears well-developed and well-nourished. She is not diaphoretic. She is active and cooperative.  Non-toxic appearance. She does not have a sickly appearance. She does not appear ill. No distress.   HENT:   Head: Normocephalic and atraumatic.   Right Ear: Tympanic membrane and external ear normal.   Left Ear: Tympanic membrane and external ear normal.   Nose: Mucosal edema and rhinorrhea present. No epistaxis.   Mouth/Throat: Uvula is midline, oropharynx is clear and moist and mucous membranes are normal. No trismus in the jaw. No oropharyngeal exudate, posterior oropharyngeal edema, posterior oropharyngeal erythema or tonsillar abscesses.   Posterior pharyngeal cobblestoning   Eyes: Conjunctivae are normal. Right eye exhibits no discharge. Left eye exhibits no discharge. No scleral icterus.   Neck: Phonation normal. No tracheal deviation present.   Normal range of motion.  Cardiovascular:  Normal rate, regular rhythm and intact distal pulses.           Pulses:       Radial pulses are 2+ on the right side and 2+ on the left side.   Pulmonary/Chest: Breath sounds normal. No accessory muscle usage or stridor. No tachypnea and no bradypnea. No respiratory distress. She has no decreased breath sounds. She has no wheezes. She has no rhonchi. She has no rales.   Abdominal: She exhibits no distension.   Musculoskeletal:         General: Normal range of motion.      Cervical back: Normal range of motion. No rigidity. Normal range of motion.     Neurological: She is alert and oriented to person, place, and time. She exhibits normal muscle tone. Coordination normal. GCS score is 15. GCS eye subscore is 4. GCS verbal subscore is 5. GCS motor subscore is 6.   Skin: Skin is warm and dry.  Capillary refill takes less than 2 seconds. No rash noted. No erythema.   Psychiatric: She has a normal mood and affect. Her speech is normal and behavior is normal. Judgment and thought content normal.       ED Course   Procedures  Labs Reviewed   SARS-COV-2 RDRP GENE - Abnormal; Notable for the following components:       Result Value    POC Rapid COVID Positive (*)     All other components within normal limits          Imaging Results    None          Medications - No data to display        APC / Resident Notes:   This is an evaluation of a 42 y.o. female that presents to the Emergency Department for fever, headache, congestion/cough. The patient is a non-toxic, afebrile, and well appearing female. On physical exam: No meningeal signs or cervical lymphadenopathy. Breath sounds equal bilaterally and without adventitious breath sounds, tachypnea or respiratory distress. Room air pulse ox of 99%. No hypoxia or dyspnea on exertion and speaking in full sentences with no pauses. Vital Signs Are Reassuring. RESULTS: COVID-19: Positive. COVID Risk Score: 0. The patient does not meet current system COVID Treatment criteria.     My overall impression is COVID-19 Positive. I considered, but at this time, do not suspect OM, OE, strep pharyngitis, influenza, meningitis, pneumonia, bacterial sinusitis, or significant dehydration requiring IV fluids or admission. The patient is maintaining oxygen saturations > 95% on room air and does not require supplemental oxygen.     The patient will be discharged home with supportive care and quarantine recommendations. Return precautions for fevers, SOB, or other worsening symptoms discussed. The diagnosis, treatment plan, instructions for follow-up and reevaluation with Primary Care as well as ED return precautions were discussed and understanding was verbalized. All questions or concerns have been addressed. ZENA Harrison, FNP-C                     Clinical Impression:   Final  diagnoses:  [U07.1] COVID-19 (Primary)        ED Disposition Condition    Discharge Stable          ED Prescriptions       Medication Sig Dispense Start Date End Date Auth. Provider    fluticasone propionate (FLONASE) 50 mcg/actuation nasal spray 2 sprays (100 mcg total) by Each Nostril route every 12 (twelve) hours. for 14 days 9.9 mL 1/20/2023 2/3/2023 ELIAN Szymanski    benzonatate (TESSALON) 100 MG capsule Take 1 capsule (100 mg total) by mouth every 8 (eight) hours as needed for Cough. 15 capsule 1/20/2023 1/30/2023 ELIAN Szymanski          Follow-up Information       Follow up With Specialties Details Why Contact Info    Your Primary Care Doctor  Schedule an appointment as soon as possible for a visit  Please call and schedule an appointment for follow up this week.     [facility] Emergency Dept Emergency Medicine Go to  If symptoms worsen [address]  [facility] [id number] [phone]             ELIAN Szymanski  01/20/23 0036

## 2023-01-20 NOTE — DISCHARGE INSTRUCTIONS
§ Please return to the Emergency Department for any new or worsening symptoms including: fever, chest pain, shortness of breath, loss of consciousness, dizziness, weakness, or any other concerns.     § Schedule an appointment for follow up with your Primary Care Doctor as soon as possible for a recheck of your symptoms. If you do not have one, contact the one listed on your discharge paperwork or call the [facility] Appointment Desk at [phone] to schedule an appointment.     § Please take all medication as prescribed. Tessalon Perles as needed for cough.  Zyrtec and Flonase nasal spray as directed for 2 weeks to help with congestion/cough.

## 2023-04-09 ENCOUNTER — HOSPITAL ENCOUNTER (EMERGENCY)
Facility: HOSPITAL | Age: 43
Discharge: HOME | End: 2023-04-09
Attending: EMERGENCY MEDICINE
Payer: MEDICAID

## 2023-04-09 VITALS
SYSTOLIC BLOOD PRESSURE: 117 MMHG | BODY MASS INDEX: 23.78 KG/M2 | DIASTOLIC BLOOD PRESSURE: 71 MMHG | WEIGHT: 117.94 LBS | HEIGHT: 59 IN | HEART RATE: 52 BPM | RESPIRATION RATE: 21 BRPM | TEMPERATURE: 98 F | OXYGEN SATURATION: 100 %

## 2023-04-09 DIAGNOSIS — N83.209 CYST OF OVARY, UNSPECIFIED LATERALITY: Primary | ICD-10-CM

## 2023-04-09 LAB
ALBUMIN SERPL BCP-MCNC: 3.7 G/DL (ref 3.5–5.2)
ALP SERPL-CCNC: 62 U/L (ref 55–135)
ALT SERPL W/O P-5'-P-CCNC: 8 U/L (ref 10–44)
ANION GAP SERPL CALC-SCNC: 10 MMOL/L (ref 8–16)
ANION GAP SERPL CALC-SCNC: 8 MMOL/L (ref 8–16)
AST SERPL-CCNC: 17 U/L (ref 10–40)
B-HCG UR QL: NEGATIVE
BASOPHILS # BLD AUTO: 0.03 K/UL (ref 0–0.2)
BASOPHILS NFR BLD: 0.5 % (ref 0–1.9)
BILIRUB SERPL-MCNC: 0.5 MG/DL (ref 0.1–1)
BILIRUB UR QL STRIP: NEGATIVE
BUN SERPL-MCNC: 10 MG/DL (ref 6–30)
BUN SERPL-MCNC: 9 MG/DL (ref 6–20)
CALCIUM SERPL-MCNC: 8.9 MG/DL (ref 8.7–10.5)
CHLORIDE SERPL-SCNC: 104 MMOL/L (ref 95–110)
CHLORIDE SERPL-SCNC: 108 MMOL/L (ref 95–110)
CLARITY UR: CLEAR
CO2 SERPL-SCNC: 22 MMOL/L (ref 23–29)
COLOR UR: YELLOW
CREAT SERPL-MCNC: 0.6 MG/DL (ref 0.5–1.4)
CREAT SERPL-MCNC: 0.7 MG/DL (ref 0.5–1.4)
CTP QC/QA: YES
DIFFERENTIAL METHOD: ABNORMAL
EOSINOPHIL # BLD AUTO: 0.3 K/UL (ref 0–0.5)
EOSINOPHIL NFR BLD: 4 % (ref 0–8)
ERYTHROCYTE [DISTWIDTH] IN BLOOD BY AUTOMATED COUNT: 19.9 % (ref 11.5–14.5)
EST. GFR  (NO RACE VARIABLE): >60 ML/MIN/1.73 M^2
GLUCOSE SERPL-MCNC: 88 MG/DL (ref 70–110)
GLUCOSE SERPL-MCNC: 89 MG/DL (ref 70–110)
GLUCOSE UR QL STRIP: NEGATIVE
HCT VFR BLD AUTO: 32.7 % (ref 37–48.5)
HCT VFR BLD CALC: 34 %PCV (ref 36–54)
HGB BLD-MCNC: 10.3 G/DL (ref 12–16)
HGB UR QL STRIP: NEGATIVE
IMM GRANULOCYTES # BLD AUTO: 0.01 K/UL (ref 0–0.04)
IMM GRANULOCYTES NFR BLD AUTO: 0.2 % (ref 0–0.5)
KETONES UR QL STRIP: NEGATIVE
LEUKOCYTE ESTERASE UR QL STRIP: NEGATIVE
LIPASE SERPL-CCNC: 13 U/L (ref 4–60)
LYMPHOCYTES # BLD AUTO: 1.8 K/UL (ref 1–4.8)
LYMPHOCYTES NFR BLD: 28 % (ref 18–48)
MCH RBC QN AUTO: 22.6 PG (ref 27–31)
MCHC RBC AUTO-ENTMCNC: 31.5 G/DL (ref 32–36)
MCV RBC AUTO: 72 FL (ref 82–98)
MONOCYTES # BLD AUTO: 0.8 K/UL (ref 0.3–1)
MONOCYTES NFR BLD: 11.9 % (ref 4–15)
NEUTROPHILS # BLD AUTO: 3.6 K/UL (ref 1.8–7.7)
NEUTROPHILS NFR BLD: 55.4 % (ref 38–73)
NITRITE UR QL STRIP: NEGATIVE
NRBC BLD-RTO: 0 /100 WBC
PH UR STRIP: 6 [PH] (ref 5–8)
PLATELET # BLD AUTO: 227 K/UL (ref 150–450)
PMV BLD AUTO: 10 FL (ref 9.2–12.9)
POC IONIZED CALCIUM: 1.24 MMOL/L (ref 1.06–1.42)
POC TCO2 (MEASURED): 30 MMOL/L (ref 23–29)
POTASSIUM BLD-SCNC: 5.7 MMOL/L (ref 3.5–5.1)
POTASSIUM SERPL-SCNC: 3.6 MMOL/L (ref 3.5–5.1)
PROT SERPL-MCNC: 7.1 G/DL (ref 6–8.4)
PROT UR QL STRIP: NEGATIVE
RBC # BLD AUTO: 4.55 M/UL (ref 4–5.4)
SAMPLE: ABNORMAL
SODIUM BLD-SCNC: 137 MMOL/L (ref 136–145)
SODIUM SERPL-SCNC: 138 MMOL/L (ref 136–145)
SP GR UR STRIP: 1.02 (ref 1–1.03)
URN SPEC COLLECT METH UR: NORMAL
UROBILINOGEN UR STRIP-ACNC: NEGATIVE EU/DL
WBC # BLD AUTO: 6.46 K/UL (ref 3.9–12.7)

## 2023-04-09 PROCEDURE — 81003 URINALYSIS AUTO W/O SCOPE: CPT | Performed by: EMERGENCY MEDICINE

## 2023-04-09 PROCEDURE — 85025 COMPLETE CBC W/AUTO DIFF WBC: CPT | Performed by: EMERGENCY MEDICINE

## 2023-04-09 PROCEDURE — 82330 ASSAY OF CALCIUM: CPT

## 2023-04-09 PROCEDURE — 82330 ASSAY OF CALCIUM: CPT | Mod: 91

## 2023-04-09 PROCEDURE — 85014 HEMATOCRIT: CPT | Mod: 91

## 2023-04-09 PROCEDURE — 96374 THER/PROPH/DIAG INJ IV PUSH: CPT

## 2023-04-09 PROCEDURE — 84132 ASSAY OF SERUM POTASSIUM: CPT

## 2023-04-09 PROCEDURE — 83690 ASSAY OF LIPASE: CPT | Performed by: EMERGENCY MEDICINE

## 2023-04-09 PROCEDURE — 84295 ASSAY OF SERUM SODIUM: CPT

## 2023-04-09 PROCEDURE — 82565 ASSAY OF CREATININE: CPT

## 2023-04-09 PROCEDURE — 99285 EMERGENCY DEPT VISIT HI MDM: CPT | Mod: 25

## 2023-04-09 PROCEDURE — 80047 BASIC METABLC PNL IONIZED CA: CPT

## 2023-04-09 PROCEDURE — 81025 URINE PREGNANCY TEST: CPT | Performed by: EMERGENCY MEDICINE

## 2023-04-09 PROCEDURE — 63600175 PHARM REV CODE 636 W HCPCS: Performed by: EMERGENCY MEDICINE

## 2023-04-09 PROCEDURE — 99900035 HC TECH TIME PER 15 MIN (STAT)

## 2023-04-09 PROCEDURE — 80053 COMPREHEN METABOLIC PANEL: CPT | Performed by: EMERGENCY MEDICINE

## 2023-04-09 RX ORDER — ACETAMINOPHEN 500 MG
500 TABLET ORAL EVERY 6 HOURS PRN
Qty: 13 TABLET | Refills: 0 | Status: SHIPPED | OUTPATIENT
Start: 2023-04-09

## 2023-04-09 RX ORDER — KETOROLAC TROMETHAMINE 30 MG/ML
15 INJECTION, SOLUTION INTRAMUSCULAR; INTRAVENOUS
Status: COMPLETED | OUTPATIENT
Start: 2023-04-09 | End: 2023-04-09

## 2023-04-09 RX ORDER — DICYCLOMINE HYDROCHLORIDE 20 MG/1
20 TABLET ORAL 2 TIMES DAILY
Qty: 20 TABLET | Refills: 0 | Status: SHIPPED | OUTPATIENT
Start: 2023-04-09 | End: 2023-05-09

## 2023-04-09 RX ORDER — IBUPROFEN 600 MG/1
600 TABLET ORAL EVERY 6 HOURS PRN
Qty: 20 TABLET | Refills: 0 | Status: SHIPPED | OUTPATIENT
Start: 2023-04-09

## 2023-04-09 RX ADMIN — KETOROLAC TROMETHAMINE 15 MG: 30 INJECTION, SOLUTION INTRAMUSCULAR; INTRAVENOUS at 06:04

## 2023-04-09 NOTE — DISCHARGE INSTRUCTIONS
Thank you for coming to our Emergency Department today. It is important to remember that some problems are difficult to diagnose and may not be found during your first visit. Be sure to follow up with your primary care doctor and review any labs/imaging that was performed with them. If you do not have a primary care doctor, you may contact the one listed on your discharge paperwork or you may also call the [facility] Appointment Desk at [phone] to schedule an appointment with one.     All medications may potentially have side effects and it is impossible to predict which medications may give you side effects. If you feel that you are having a negative effect of any medication you should immediately stop taking them and seek medical attention.    Return to the ER with any questions/concerns, new/concerning symptoms, worsening or failure to improve. Do not drive or make any important decisions for 24 hours if you have received any pain medications, sedatives or mood altering drugs during your ER visit.

## 2023-04-09 NOTE — ED PROVIDER NOTES
Encounter Date: 4/9/2023    SCRIBE #1 NOTE: I, Rose Gonzalez, am scribing for, and in the presence of,  Blane Escalante MD. I have scribed the following portions of the note - Other sections scribed: HPI, ROS, PE.     History     Chief Complaint   Patient presents with    Abdominal Pain     Patient arrives via EMS with lower abdominal pain that has been ongoing for the past 30 minutes. Denies NVD, vaginal bleeding. History of ovarian cysts.     Von Uribe is a 42 y.o. female, with a past medical history of uterine fibroids and ovarian cyst, who presents to the ED with constant suprapubic abdominal pain that began last pm. No other exacerbating or alleviating factors. Patient reports having sexual intercourse last pm. Patient has a surgical history of tubal ligation. Patient denies nausea, emesis, diarrhea, vaginal pain, dysuria, fever, or other associated symptoms. Patient admits to tobacco use but denies alcohol and illicit drug use. Patient denies recent trauma, new sexual partners, or current medications. No known allergies.       The history is provided by the patient. No  was used.   Review of patient's allergies indicates:  No Known Allergies  Past Medical History:   Diagnosis Date    Uterine fibroid      Past Surgical History:   Procedure Laterality Date    tubaligation       History reviewed. No pertinent family history.  Social History     Tobacco Use    Smoking status: Every Day     Packs/day: 0.50     Types: Cigarettes    Smokeless tobacco: Never    Tobacco comments:     2-3 cigs daily   Substance Use Topics    Alcohol use: No    Drug use: Yes     Types: Marijuana     Review of Systems   Constitutional:  Negative for chills and fever.   HENT:  Negative for congestion and sore throat.    Eyes:  Negative for pain and visual disturbance.   Respiratory:  Negative for chest tightness and shortness of breath.    Cardiovascular:  Negative for chest pain.   Gastrointestinal:   Positive for abdominal pain (suprapubic). Negative for diarrhea, nausea and vomiting.   Endocrine: Negative for polydipsia.   Genitourinary:  Negative for dysuria, frequency and vaginal pain.   Musculoskeletal:  Negative for back pain, neck pain and neck stiffness.   Skin:  Negative for rash.   Allergic/Immunologic: Negative for immunocompromised state.   Hematological:  Does not bruise/bleed easily.   All other systems reviewed and are negative.    Physical Exam     Initial Vitals [04/09/23 0555]   BP Pulse Resp Temp SpO2   (!) 156/84 (!) 54 20 97.9 °F (36.6 °C) 99 %      MAP       --         Physical Exam    Nursing note and vitals reviewed.  Constitutional: She appears well-developed and well-nourished.   HENT:   Head: Normocephalic.   Right Ear: External ear normal.   Left Ear: External ear normal.   Mouth/Throat: Oropharynx is clear and moist.   Eyes: EOM are normal. Pupils are equal, round, and reactive to light.   Neck:   Normal range of motion.  Cardiovascular:  Normal rate and regular rhythm.           Pulmonary/Chest: Breath sounds normal. No respiratory distress. She has no wheezes.   Abdominal: Abdomen is soft. Bowel sounds are normal. She exhibits no distension. There is abdominal tenderness in the suprapubic area. There is no rebound and no guarding.   Genitourinary:    Genitourinary Comments: Escorted by nursing.  No discharge.  No adenoma axis or tenderness.  No cervical motion tenderness.     Musculoskeletal:         General: No tenderness or edema. Normal range of motion.      Cervical back: Normal range of motion.     Neurological: She is alert and oriented to person, place, and time. She has normal strength. GCS score is 15. GCS eye subscore is 4. GCS verbal subscore is 5. GCS motor subscore is 6.   Skin: Skin is warm and dry. Capillary refill takes less than 2 seconds.   Psychiatric: She has a normal mood and affect. Thought content normal.       ED Course   Procedures  Labs Reviewed   CBC W/  AUTO DIFFERENTIAL - Abnormal; Notable for the following components:       Result Value    Hemoglobin 10.3 (*)     Hematocrit 32.7 (*)     MCV 72 (*)     MCH 22.6 (*)     MCHC 31.5 (*)     RDW 19.9 (*)     All other components within normal limits   COMPREHENSIVE METABOLIC PANEL - Abnormal; Notable for the following components:    CO2 22 (*)     ALT 8 (*)     All other components within normal limits   ISTAT PROCEDURE - Abnormal; Notable for the following components:    POC Potassium 5.7 (*)     POC TCO2 (MEASURED) 30 (*)     POC Hematocrit 34 (*)     All other components within normal limits   LIPASE   URINALYSIS, REFLEX TO URINE CULTURE    Narrative:     Specimen Source->Urine   POCT URINE PREGNANCY   ISTAT CHEM8          Imaging Results              US Pelvis Comp with Transvag NON-OB (xpd (Final result)  Result time 04/09/23 09:14:24      Final result by Sal Mohamud MD (04/09/23 09:14:24)                   Impression:      1. Hemorrhagic cyst associated with the right ovary in keeping with corpus luteum or follicular cyst.  2. Additional details, as provided in the body of the report.      Electronically signed by: Sal Mohamud  Date:    04/09/2023  Time:    09:14               Narrative:    EXAMINATION:  US PELVIS COMP WITH TRANSVAG NON-OB (XPD)    CLINICAL HISTORY:  pelvic pain;    TECHNIQUE:  Transabdominal sonography of the pelvis was performed, followed by transvaginal sonography to better evaluate the uterus and ovaries.    COMPARISON:  None.    FINDINGS:  Uterus:    Size: 11.1 x 4.6 x 6.6 cm    Masses: None    Endometrium: Normal in this pre menopausal patient, measuring 6.5 mm.    Additional comment: Suspected nabothian cysts.    Right ovary:    Size: 5.1 x 2.6 x 4.7 cm    Appearance: Mixed echogenicity, avascular likely hemorrhagic cyst measures 1.9 x 2.1 x 2.5 cm.    Vascular flow: Normal.    Left ovary:    Size: 2.8 x 1 x 2.2 cm    Appearance: Normal    Vascular Flow: Normal.    Free  Fluid:    Free fluid dependently within the pelvis, overall measuring on the order of 1.9 x 1.4 x 1.8 cm.                                       Medications   ketorolac injection 15 mg (15 mg Intravenous Given 4/9/23 0653)     Medical Decision Making:   History:   Old Medical Records: I decided to obtain old medical records.  Initial Assessment:   42 year old patient presenting 2/2 abdominal pain in lower abdomen with a history of ovarian cysts.  Highest on my differential is ovarian cyst.    Also considered but less likely:     AAA: location inconsistent, no bruits, no history of HTN  Cholecystitis: location inconsistent, no relation with meals, negative oviedo's  SBO: normal BM and flatus. No vomiting  Appendicitis: location inconsistent, no fever, no rebound/guarding  Mesenteric ischemia: HPI inconsistent, does not coincide with meals, other dx more likely  Kidney stone: no radiation to back or cva tenderness, no dysuria, no hematuria  Pyelonephritis: no cva tenderness, no dysuria, no fever  Pancreatitis: no history of alcohol abuse, unlikely gallstone obstructing, location inconsistent  Diverticulitis: age and location not most common, no history of diverticulitis, no fever, no wbc  TOA: no systemic symptoms, location inconsistent, pelvic exam benign  Ectopic: negative pregnancy test  Torsion:  Ultrasound negative  PID: no history of STDs, no vaginal discharge, no cervical motion tenderness    Labs reassuring.  IV Toradol for pain control with improved pain control.  Afebrile.  Ultrasound shows ovarian cyst.  Patient states she has an OBGYN she can follow-up with.    Patient discharged home with medications below.. Return precautions given, patient understands and agrees with plan. All questions answered.  Instructed to follow up with PCP. I discussed with the patient/family the diagnosis, treatment plan, indications for return to the emergency department, and for expected follow-up. The patient/family verbalized  an understanding. The patient/family is asked if there are any questions or concerns. We discuss the case, until all issues are addressed to the patient/family's satisfaction. Patient/family understands and is agreeable to the plan.   Blane Escalante          Clinical Tests:   Lab Tests: Ordered and Reviewed  Radiological Study: Ordered and Reviewed        Scribe Attestation:   Scribe #1: I performed the above scribed service and the documentation accurately describes the services I performed. I attest to the accuracy of the note.                   Clinical Impression:   Final diagnoses:  [N83.209] Cyst of ovary, unspecified laterality (Primary)        ED Disposition Condition    Discharge Stable        I, blane escalante, personally performed the services described in this documentation. All medical record entries made by the scribe were at my direction and in my presence. I have reviewed the chart and agree that the record reflects my personal performance and is accurate and complete.    ED Prescriptions       Medication Sig Dispense Start Date End Date Auth. Provider    dicyclomine (BENTYL) 20 mg tablet Take 1 tablet (20 mg total) by mouth 2 (two) times daily. 20 tablet 4/9/2023 5/9/2023 Blane Escalante MD    ibuprofen (ADVIL,MOTRIN) 600 MG tablet Take 1 tablet (600 mg total) by mouth every 6 (six) hours as needed for Pain. 20 tablet 4/9/2023 -- Blane Escalante MD    acetaminophen (TYLENOL) 500 MG tablet Take 1 tablet (500 mg total) by mouth every 6 (six) hours as needed. 13 tablet 4/9/2023 -- Blane Escalante MD          Follow-up Information       Follow up With Specialties Details Why Contact Info    Barry Finnegan MD Obstetrics and Gynecology, Obstetrics and Gynecology Schedule an appointment as soon as possible for a visit in 2 days  [address]  [address]  [facility] [address]  [phone]               Blane Escalante MD  04/09/23 1549

## 2023-04-09 NOTE — Clinical Note
"Von Tello" Rony was seen and treated in our emergency department on 4/9/2023.  She may return to work on 04/11/2023.       If you have any questions or concerns, please don't hesitate to call.      Blane Escalante MD"

## 2023-04-12 ENCOUNTER — PATIENT OUTREACH (OUTPATIENT)
Dept: EMERGENCY MEDICINE | Facility: HOSPITAL | Age: 43
End: 2023-04-12
Payer: MEDICAID

## 2023-04-12 NOTE — PROGRESS NOTES
Prasad Joe  ED Navigator  Emergency Department    Project: [facility] [place]  Role: Community Health Worker    Date: 04/12/2023  Patient Name: Von Uribe  MRN: [id number]  PCP: [facility]    Assessment:     Von Uribe is a 42 y.o. female who has presented to ED for abdominal pain . Patient has visited the ED 2 times in the past 3 months. Patient did contact PCP.     ED Navigator Initial Assessment    ED Navigator Enrollment Documentation  Consent to Services  Does patient consent to completing the assessment?: Yes  Contact  Method of Initial Contact: Phone  Transportation  Does the patient have issues with Transportation?: No  Does the patient have transportation to and from healthcare appointments?: Yes  Insurance Coverage  Do you have coverage/adequate coverage?: Yes  Type/kind of coverage: Primary Cvg:  Medicaid/[employer] [facility] [place] Stamp ([place] Medicaid)  Is patient able to afford co-pays/deductibles?: Yes  Is patient able to afford HME or supplies?: Yes  Does patient have an established [facility] PCP?: Yes  Able to access?: Yes  Does the patient have a lack of adequate coverage?: No  Specialist Appointment  Did the patient come to the ED to see a specialist?: No  Does the patient have a pending specialist referral?: No  Does the patient have a specialist appointment made?: No  PCP Follow Up Appointment  Has the patient had an appointment with a primary care provider in the past year?: Yes  Approximate date: 10/12/22  Provider: [facility]  Does the patient have a follow up appontment with a PCP?: Yes  Upcoming appointment date: 4/26/23  Provider: [facility]  When was the last time you saw your PCP?: 10/12/22  Medications  Is patient able to afford medication?: Yes  Is patient unable to get medication due to lack of transportation?: No  Psychological  Does the patient have psycho-social concerns?:  No  Food  Does the patient have concerns about food?: Yes  What concerns does the patient have regarding food?: Lack of access to food  Communication/Education  Does the patient have limited English proficiency/English not primary language?: No  Does patient have low literacy and/or low health literacy?: Yes  Does patient have concerns with care?: No  Does patient have dissatisfaction with care?: No  Other Financial Concerns  Does the patient have immediate financial distress?: No  Does the patient have general financial concerns?: No  Other Social Barriers/Concerns  Does the patient have any additional barriers or concerns?: Dental  Primary Barrier  Barriers identified: Cognitive barrier (health literacy, language and communication, etc.)  Root Cause of ED Utilization: Patient Knowledge/Low Health Literacy  Plan to address Patient Knowledge/Low Health Literacy: Provided information for [facility] On [facility] 24/7 Nurse triage line [phone] or 1-866-[facility] ([phone])  Next steps: Provided Education  Was education/educational materials provided surrounding PCP services/creating a medical home?: Yes Was education verbal or written?: Verbal     Was education/educational materials provided surrounding low cost, healthy foods?: Yes Was education verbal or written?: Written     Was education/educational materials provided surrounding other items? If so, use comment to explain.: Yes Was education verbal or written?: Written   Plan: The patient was given Dental Resources for their region.  Expected Date of Follow Up 1: 5/17/23  Additional Documentation: ED Navigator spoke with patient regarding her recent ED visit, Patient is doing well. Patient has a follow-up appointment scheduled. Assessment completed. Patient request assistance with a number for food stamp office, food resources, and a Dental list.In addition to the requested resources, Right Care Right Place form, [place] Virtual Visit Flyer, [facility] PCP scheduling  assistance, [facility] on call RN#, and Heart Healthy Diet education were sent via verified e-mail.  Prasad Joe          Social History     Socioeconomic History    Marital status: Single   Tobacco Use    Smoking status: Every Day     Packs/day: 0.50     Types: Cigarettes    Smokeless tobacco: Never    Tobacco comments:     2-3 cigs daily   Substance and Sexual Activity    Alcohol use: No    Drug use: Yes     Types: Marijuana    Sexual activity: Yes     Partners: Male     Social Determinants of Health     Financial Resource Strain: Low Risk     Difficulty of Paying Living Expenses: Not hard at all   Food Insecurity: Food Insecurity Present    Worried About Running Out of Food in the Last Year: Often true    Ran Out of Food in the Last Year: Often true   Transportation Needs: No Transportation Needs    Lack of Transportation (Medical): No    Lack of Transportation (Non-Medical): No   Physical Activity: Inactive    Days of Exercise per Week: 0 days    Minutes of Exercise per Session: 0 min   Stress: No Stress Concern Present    Feeling of Stress : Not at all   Social Connections: Moderately Integrated    Frequency of Communication with Friends and Family: More than three times a week    Frequency of Social Gatherings with Friends and Family: More than three times a week    Attends [employer] Services: 1 to 4 times per year    Active Member of Clubs or Organizations: Yes    Attends Club or Organization Meetings: Never    Marital Status: Never    Housing Stability: Low Risk     Unable to Pay for Housing in the Last Year: No    Number of Places Lived in the Last Year: 1    Unstable Housing in the Last Year: No       Plan:   ED Navigator spoke with patient regarding her recent ED visit, Patient is doing well. Patient has a follow-up appointment scheduled. Assessment completed. Patient request assistance with a number for food stamp office, food resources, and a Dental list.In addition to the requested resources, Right  Care Right Place form, [place] Virtual Visit Flyer, [facility] PCP scheduling assistance, [facility] on call RN#, and Heart Healthy Diet education were sent via verified e-mail.  Prasad Joe       Appointment made with: [facility]

## 2023-05-17 ENCOUNTER — PATIENT OUTREACH (OUTPATIENT)
Dept: EMERGENCY MEDICINE | Facility: HOSPITAL | Age: 43
End: 2023-05-17
Payer: MEDICAID

## 2024-05-15 ENCOUNTER — PATIENT OUTREACH (OUTPATIENT)
Dept: EMERGENCY MEDICINE | Facility: HOSPITAL | Age: 44
End: 2024-05-15
Payer: MEDICAID

## 2024-05-15 NOTE — PROGRESS NOTES
ED Navigator phoned patient for follow-up. Patient was unavailable. Follow-up scheduled.  Prasad Joe

## 2025-04-15 ENCOUNTER — OFFICE VISIT (OUTPATIENT)
Dept: OBSTETRICS AND GYNECOLOGY | Facility: CLINIC | Age: 45
End: 2025-04-15
Payer: MEDICAID

## 2025-04-15 VITALS
WEIGHT: 148.13 LBS | SYSTOLIC BLOOD PRESSURE: 100 MMHG | HEIGHT: 60 IN | DIASTOLIC BLOOD PRESSURE: 60 MMHG | BODY MASS INDEX: 29.08 KG/M2

## 2025-04-15 DIAGNOSIS — N71.1 CHRONIC ENDOMETRITIS: ICD-10-CM

## 2025-04-15 DIAGNOSIS — Z01.419 WELL WOMAN EXAM WITH ROUTINE GYNECOLOGICAL EXAM: Primary | ICD-10-CM

## 2025-04-15 DIAGNOSIS — Z12.31 SCREENING MAMMOGRAM, ENCOUNTER FOR: ICD-10-CM

## 2025-04-15 DIAGNOSIS — N92.0 MENORRHAGIA WITH REGULAR CYCLE: ICD-10-CM

## 2025-04-15 DIAGNOSIS — N94.6 DYSMENORRHEA: ICD-10-CM

## 2025-04-15 PROBLEM — N10 ACUTE PYELONEPHRITIS: Status: RESOLVED | Noted: 2019-03-18 | Resolved: 2025-04-15

## 2025-04-15 PROBLEM — N30.01 ACUTE CYSTITIS WITH HEMATURIA: Status: RESOLVED | Noted: 2019-03-02 | Resolved: 2025-04-15

## 2025-04-15 PROBLEM — R11.0 NAUSEA: Status: RESOLVED | Noted: 2019-10-20 | Resolved: 2025-04-15

## 2025-04-15 PROBLEM — A08.4 VIRAL GASTROENTERITIS: Status: RESOLVED | Noted: 2019-04-04 | Resolved: 2025-04-15

## 2025-04-15 PROCEDURE — 99213 OFFICE O/P EST LOW 20 MIN: CPT | Mod: PBBFAC | Performed by: OBSTETRICS & GYNECOLOGY

## 2025-04-15 PROCEDURE — 87624 HPV HI-RISK TYP POOLED RSLT: CPT | Performed by: OBSTETRICS & GYNECOLOGY

## 2025-04-15 PROCEDURE — 99999 PR PBB SHADOW E&M-EST. PATIENT-LVL III: CPT | Mod: PBBFAC,,, | Performed by: OBSTETRICS & GYNECOLOGY

## 2025-04-15 PROCEDURE — 88175 CYTOPATH C/V AUTO FLUID REDO: CPT | Performed by: OBSTETRICS & GYNECOLOGY

## 2025-04-15 RX ORDER — METRONIDAZOLE 500 MG/1
500 TABLET ORAL EVERY 12 HOURS
Qty: 20 TABLET | Refills: 0 | Status: SHIPPED | OUTPATIENT
Start: 2025-04-15 | End: 2025-04-25

## 2025-04-15 RX ORDER — DOXYCYCLINE 100 MG/1
100 CAPSULE ORAL EVERY 12 HOURS
Qty: 20 CAPSULE | Refills: 0 | Status: SHIPPED | OUTPATIENT
Start: 2025-04-15 | End: 2025-04-25

## 2025-04-15 NOTE — PROGRESS NOTES
Subjective:       Patient ID: Von Uribe is a 44 y.o. female.    Chief Complaint:  Annual Exam and Dysmenorrhea (Heavy menstrual bleeding with clots)        History of Present Illness  HPI  Annual Exam-Premenopausal  Patient presents for annual exam. The patient is sexually active. GYN screening history: no prior history of gyn screening tests. The patient wears seatbelts: yes. The patient participates in regular exercise: yes. Has the patient ever been transfused or tattooed?: not asked. The patient reports that there is not domestic violence in her life.    Has had heavy menses with some pain  But after her last cycle, deep pelvic pain has not resolved.  Now with pain daily.  No radiation.    Was told previously to have uterine fibroids. Would like treatment    But chart review and pelvic ultrasound reviewed on 23 without any uterine fibroids.  Uterus at 11.1 x 4.6 x 6.6 cm    GYN & OB History  Patient's last menstrual period was 2025 (approximate).   Date of Last Pap: No result found    OB History    Para Term  AB Living   3 3 3      SAB IAB Ectopic Multiple Live Births             # Outcome Date GA Lbr Jj/2nd Weight Sex Type Anes PTL Lv   3 Term            2 Term            1 Term               Obstetric Comments    x 3       Past Medical History:   Diagnosis Date    Uterine fibroid        Past Surgical History:   Procedure Laterality Date    tubaligation         Family History   Problem Relation Name Age of Onset    Cancer Maternal Grandmother          blood    Hypertension Maternal Grandmother      Diabetes Maternal Grandmother      Dementia Maternal Grandmother      Hypertension Mother         Social History     Socioeconomic History    Marital status: Single   Tobacco Use    Smoking status: Former     Current packs/day: 0.50     Types: Cigarettes    Smokeless tobacco: Never    Tobacco comments:     2-3 cigs daily   Substance and Sexual Activity    Alcohol use: No     Drug use: Yes     Types: Marijuana    Sexual activity: Yes     Partners: Male     Birth control/protection: See Surgical Hx   Social History Narrative    Together since 2021    He does manual labor    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     Social Drivers of Health     Financial Resource Strain: Low Risk  (4/12/2023)    Overall Financial Resource Strain (CARDIA)     Difficulty of Paying Living Expenses: Not hard at all   Food Insecurity: Food Insecurity Present (4/12/2023)    Hunger Vital Sign     Worried About Running Out of Food in the Last Year: Often true     Ran Out of Food in the Last Year: Often true   Transportation Needs: No Transportation Needs (4/12/2023)    PRAPARE - Transportation     Lack of Transportation (Medical): No     Lack of Transportation (Non-Medical): No   Physical Activity: Inactive (4/12/2023)    Exercise Vital Sign     Days of Exercise per Week: 0 days     Minutes of Exercise per Session: 0 min   Stress: No Stress Concern Present (4/12/2023)    [employer] [place] - Occupational Stress Questionnaire     Feeling of Stress : Not at all   Housing Stability: Low Risk  (4/12/2023)    Housing Stability Vital Sign     Unable to Pay for Housing in the Last Year: No     Number of Places Lived in the Last Year: 1     Unstable Housing in the Last Year: No       Current Medications[1]    Review of patient's allergies indicates:  No Known Allergies     Review of Systems  Review of Systems   Constitutional:  Negative for activity change, appetite change, chills, fatigue, fever and unexpected weight change.   HENT:  Negative for mouth sores.    Respiratory:  Negative for cough, shortness of breath and wheezing.    Cardiovascular:  Negative for chest pain and palpitations.   Gastrointestinal:  Positive for abdominal pain. Negative for bloating, blood in stool, constipation, nausea and vomiting.   Endocrine: Negative for diabetes and hot flashes.    Genitourinary:  Positive for menorrhagia, menstrual problem, pelvic pain and vaginal discharge. Negative for dysmenorrhea, dyspareunia, dysuria, frequency, hematuria, urgency, vaginal bleeding, vaginal pain, urinary incontinence, postcoital bleeding and vaginal odor.   Musculoskeletal:  Negative for back pain and myalgias.   Integumentary:  Negative for rash, breast mass and nipple discharge.   Neurological:  Negative for seizures and headaches.   Psychiatric/Behavioral:  Negative for depression and sleep disturbance. The patient is not nervous/anxious.    Breast: Negative for mass, mastodynia and nipple discharge          Objective:    Physical Exam:   Constitutional: She appears well-developed and well-nourished. No distress.   BMI of 28.93    HENT:   Head: Normocephalic and atraumatic.    Eyes: EOM are normal.      Pulmonary/Chest: Effort normal. No respiratory distress.   Breasts: Non-tender, no engorgement, no masses, no retraction, no discharge. Negative for lymphadenopathy.         Abdominal: Soft. She exhibits no distension. There is no abdominal tenderness. There is no rebound and no guarding.   Small Pfannenstiel scar       Genitourinary:    Uterus normal.   There is vaginal discharge in the vagina.    Genitourinary Comments: Vulva without any obvious lesions.  Urethral meatus normal size and location without any lesion.  Urethra is non-tender without stricture or discharge.  Bladder is non-tender.  Vaginal vault with good support.  Moderate yellowish discharge noted.  No obvious lesion.  Normal rugation.  Cervix is with minimal cervical motion tenderness.  No obvious lesion.  Uterus is small, very tender, normal contour.  Adnexa is without any masses or tenderness.  Perineum without obvious lesion.               Musculoskeletal: Normal range of motion.       Neurological: She is alert.    Skin: Skin is warm and dry.    Psychiatric: She has a normal mood and affect.          Urine dipstick with leuk and  prot    Assessment:        1. Well woman exam with routine gynecological exam    2. Menorrhagia with regular cycle    3. Dysmenorrhea    4. Screening mammogram, encounter for    5. Chronic endometritis              Plan:          I have discussed with the patient her condition.  Monthly breast examination was instructed, discussed, and encouraged.  Patient was encouraged to consume a low-calorie, low fat diet, and to increase of physical activity.  Healthy habits encouraged.  A Pap smear was performed with HR-HPV according to the USPSTF recommendations.  Mammogram was ordered because of the combination of her age and risk factors, according to ACOG guidelines.  Gonorrhea and Chlamydia testing performed;  HIV test offered, again according to guidelines.  Colonoscopy discussed according to ACS guideline.  We also discussed her obstetric history and CV risks.   Care Gaps addressed.  Patient is to continue her medications as prescribed.  She will come back to see me in one year for her annual visit.  She can come back to see me sooner as necessary.  All of her questions were answered appropriately to her satisfaction.     Exam does not indicate uterine fibroids  Pelvic ultrasound    We discussed possible etiology for pelvic pain.    Tender uterus.  The diagnosis of chronic endometritis vs IC discussed  Doxy and metronidazole prescribed  Back in 2 weeks    ** A female chaperone, Lizzie Beltran, was present for the pelvic exam              [1]   Current Outpatient Medications   Medication Sig Dispense Refill    acetaminophen (TYLENOL) 500 MG tablet Take 1 tablet (500 mg total) by mouth every 6 (six) hours as needed. 13 tablet 0    acetaminophen (TYLENOL) 650 MG TbSR Take 1 tablet (650 mg total) by mouth every 8 (eight) hours. 20 tablet 0    ibuprofen (ADVIL,MOTRIN) 600 MG tablet Take 1 tablet (600 mg total) by mouth every 6 (six) hours as needed for Pain. 20 tablet 0    ibuprofen (ADVIL,MOTRIN) 600 MG tablet Take 1  tablet (600 mg total) by mouth every 6 (six) hours as needed for Pain. 20 tablet 0     No current facility-administered medications for this visit.

## 2025-04-22 ENCOUNTER — RESULTS FOLLOW-UP (OUTPATIENT)
Dept: OBSTETRICS AND GYNECOLOGY | Facility: HOSPITAL | Age: 45
End: 2025-04-22

## 2025-04-22 ENCOUNTER — PATIENT MESSAGE (OUTPATIENT)
Dept: OBSTETRICS AND GYNECOLOGY | Facility: HOSPITAL | Age: 45
End: 2025-04-22
Payer: MEDICAID

## 2025-04-22 DIAGNOSIS — A54.9 GONORRHEA: Primary | ICD-10-CM

## 2025-04-22 RX ORDER — CEFTRIAXONE 500 MG/1
500 INJECTION, POWDER, FOR SOLUTION INTRAMUSCULAR; INTRAVENOUS ONCE
Qty: 0.5 G | Refills: 0 | Status: SHIPPED | OUTPATIENT
Start: 2025-04-22 | End: 2025-04-22

## 2025-04-22 NOTE — TELEPHONE ENCOUNTER
Rocephin 500 mg IM once ordered for gonorrhea cervicitis.  Her partner would also need to be treated.  Patient should know that this is a sexually-transmitted disease.

## 2025-04-23 ENCOUNTER — CLINICAL SUPPORT (OUTPATIENT)
Dept: OBSTETRICS AND GYNECOLOGY | Facility: CLINIC | Age: 45
End: 2025-04-23
Payer: MEDICAID

## 2025-04-23 DIAGNOSIS — A54.9 GONORRHEA: Primary | ICD-10-CM

## 2025-04-23 PROCEDURE — 96372 THER/PROPH/DIAG INJ SC/IM: CPT | Mod: PBBFAC

## 2025-04-23 PROCEDURE — 99999PBSHW PR PBB SHADOW TECHNICAL ONLY FILED TO HB: Mod: PBBFAC,,,

## 2025-04-23 RX ORDER — CEFTRIAXONE 500 MG/1
500 INJECTION, POWDER, FOR SOLUTION INTRAMUSCULAR; INTRAVENOUS ONCE
Status: COMPLETED | OUTPATIENT
Start: 2025-04-23 | End: 2025-04-23

## 2025-04-23 RX ORDER — LIDOCAINE HYDROCHLORIDE AND EPINEPHRINE 10; 20 UG/ML; MG/ML
1 INJECTION, SOLUTION INFILTRATION; PERINEURAL
Status: COMPLETED | OUTPATIENT
Start: 2025-04-23 | End: 2025-04-23

## 2025-04-23 RX ADMIN — CEFTRIAXONE SODIUM 500 MG: 500 INJECTION, POWDER, FOR SOLUTION INTRAMUSCULAR; INTRAVENOUS at 04:04

## 2025-04-23 RX ADMIN — LIDOCAINE HYDROCHLORIDE AND EPINEPHRINE 1 ML: 10; 20 INJECTION, SOLUTION INFILTRATION; PERINEURAL at 04:04

## 2025-04-23 NOTE — PROGRESS NOTES
Pt arrived brought rocephin injection with her and it was given per md order without difficulty. Pt instructed to wait in the waiting area for 15 minutes for any adverse reactions. Pt verbalized understanding and pt was scheduled for follow up appt in 12 weeks.

## 2025-05-09 ENCOUNTER — HOSPITAL ENCOUNTER (INPATIENT)
Facility: HOSPITAL | Age: 45
LOS: 4 days | Discharge: HOME | DRG: 690 | End: 2025-05-13
Attending: EMERGENCY MEDICINE | Admitting: STUDENT IN AN ORGANIZED HEALTH CARE EDUCATION/TRAINING PROGRAM
Payer: MEDICAID

## 2025-05-09 DIAGNOSIS — N12 PYELONEPHRITIS: Primary | ICD-10-CM

## 2025-05-09 DIAGNOSIS — R07.9 CHEST PAIN: ICD-10-CM

## 2025-05-09 DIAGNOSIS — N15.1 RENAL ABSCESS, LEFT: ICD-10-CM

## 2025-05-09 DIAGNOSIS — Z01.810 PREOP CARDIOVASCULAR EXAM: ICD-10-CM

## 2025-05-09 PROBLEM — E87.6 HYPOKALEMIA: Status: ACTIVE | Noted: 2025-05-09

## 2025-05-09 PROBLEM — D72.829 LEUKOCYTOSIS: Status: ACTIVE | Noted: 2025-05-09

## 2025-05-09 PROBLEM — E66.3 OVERWEIGHT (BMI 25.0-29.9): Status: ACTIVE | Noted: 2025-05-09

## 2025-05-09 LAB
ABSOLUTE EOSINOPHIL (OHS): 0.22 K/UL
ABSOLUTE MONOCYTE (OHS): 1.34 K/UL (ref 0.3–1)
ABSOLUTE NEUTROPHIL COUNT (OHS): 20.23 K/UL (ref 1.8–7.7)
ALBUMIN SERPL BCP-MCNC: 3.2 G/DL (ref 3.5–5.2)
ALP SERPL-CCNC: 86 UNIT/L (ref 40–150)
ALT SERPL W/O P-5'-P-CCNC: 9 UNIT/L (ref 10–44)
ANION GAP (OHS): 10 MMOL/L (ref 8–16)
AST SERPL-CCNC: 13 UNIT/L (ref 11–45)
B-HCG UR QL: NEGATIVE
BACTERIA #/AREA URNS AUTO: ABNORMAL /HPF
BASOPHILS # BLD AUTO: 0.04 K/UL
BASOPHILS NFR BLD AUTO: 0.2 %
BILIRUB SERPL-MCNC: 0.9 MG/DL (ref 0.1–1)
BILIRUB UR QL STRIP.AUTO: NEGATIVE
BUN SERPL-MCNC: 12 MG/DL (ref 6–20)
CALCIUM SERPL-MCNC: 8.9 MG/DL (ref 8.7–10.5)
CHLORIDE SERPL-SCNC: 102 MMOL/L (ref 95–110)
CLARITY UR: ABNORMAL
CO2 SERPL-SCNC: 20 MMOL/L (ref 23–29)
COLOR UR AUTO: YELLOW
CREAT SERPL-MCNC: 0.8 MG/DL (ref 0.5–1.4)
CTP QC/QA: YES
ERYTHROCYTE [DISTWIDTH] IN BLOOD BY AUTOMATED COUNT: 21.3 % (ref 11.5–14.5)
GFR SERPLBLD CREATININE-BSD FMLA CKD-EPI: >60 ML/MIN/1.73/M2
GLUCOSE SERPL-MCNC: 144 MG/DL (ref 70–110)
GLUCOSE UR QL STRIP: NEGATIVE
HCT VFR BLD AUTO: 32 % (ref 37–48.5)
HGB BLD-MCNC: 10.2 GM/DL (ref 12–16)
HGB UR QL STRIP: ABNORMAL
HOLD SPECIMEN: NORMAL
HYALINE CASTS UR QL AUTO: 0 /LPF (ref 0–1)
IMM GRANULOCYTES # BLD AUTO: 0.32 K/UL (ref 0–0.04)
IMM GRANULOCYTES NFR BLD AUTO: 1.4 % (ref 0–0.5)
KETONES UR QL STRIP: ABNORMAL
LEUKOCYTE ESTERASE UR QL STRIP: ABNORMAL
LIPASE SERPL-CCNC: <3 U/L (ref 4–60)
LYMPHOCYTES # BLD AUTO: 0.77 K/UL (ref 1–4.8)
MCH RBC QN AUTO: 23.8 PG (ref 27–31)
MCHC RBC AUTO-ENTMCNC: 31.9 G/DL (ref 32–36)
MCV RBC AUTO: 75 FL (ref 82–98)
MICROSCOPIC COMMENT: ABNORMAL
NITRITE UR QL STRIP: NEGATIVE
NON-SQ EPI CELLS #/AREA URNS AUTO: 10 /HPF
NUCLEATED RBC (/100WBC) (OHS): 0 /100 WBC
PH UR STRIP: 7 [PH]
PLATELET # BLD AUTO: 239 K/UL (ref 150–450)
PMV BLD AUTO: 10.5 FL (ref 9.2–12.9)
POTASSIUM SERPL-SCNC: 3.1 MMOL/L (ref 3.5–5.1)
PROCALCITONIN SERPL-MCNC: 1.05 NG/ML
PROT SERPL-MCNC: 7.6 GM/DL (ref 6–8.4)
PROT UR QL STRIP: ABNORMAL
RBC # BLD AUTO: 4.28 M/UL (ref 4–5.4)
RBC #/AREA URNS AUTO: 91 /HPF (ref 0–4)
RELATIVE EOSINOPHIL (OHS): 1 %
RELATIVE LYMPHOCYTE (OHS): 3.4 % (ref 18–48)
RELATIVE MONOCYTE (OHS): 5.8 % (ref 4–15)
RELATIVE NEUTROPHIL (OHS): 88.2 % (ref 38–73)
SODIUM SERPL-SCNC: 132 MMOL/L (ref 136–145)
SP GR UR STRIP: 1.02
SQUAMOUS #/AREA URNS AUTO: 16 /HPF
UROBILINOGEN UR STRIP-ACNC: ABNORMAL EU/DL
WBC # BLD AUTO: 22.92 K/UL (ref 3.9–12.7)
WBC #/AREA URNS AUTO: >100 /HPF (ref 0–5)
WBC CLUMPS UR QL AUTO: ABNORMAL

## 2025-05-09 PROCEDURE — 82040 ASSAY OF SERUM ALBUMIN: CPT

## 2025-05-09 PROCEDURE — 96361 HYDRATE IV INFUSION ADD-ON: CPT

## 2025-05-09 PROCEDURE — 25000003 PHARM REV CODE 250

## 2025-05-09 PROCEDURE — 83690 ASSAY OF LIPASE: CPT

## 2025-05-09 PROCEDURE — 84145 PROCALCITONIN (PCT): CPT

## 2025-05-09 PROCEDURE — 21400001 HC TELEMETRY ROOM

## 2025-05-09 PROCEDURE — 99223 1ST HOSP IP/OBS HIGH 75: CPT | Mod: ,,, | Performed by: UROLOGY

## 2025-05-09 PROCEDURE — 25500020 PHARM REV CODE 255

## 2025-05-09 PROCEDURE — 81025 URINE PREGNANCY TEST: CPT

## 2025-05-09 PROCEDURE — 87591 N.GONORRHOEAE DNA AMP PROB: CPT

## 2025-05-09 PROCEDURE — 87086 URINE CULTURE/COLONY COUNT: CPT

## 2025-05-09 PROCEDURE — 99285 EMERGENCY DEPT VISIT HI MDM: CPT | Mod: 25

## 2025-05-09 PROCEDURE — 63600175 PHARM REV CODE 636 W HCPCS

## 2025-05-09 PROCEDURE — 81003 URINALYSIS AUTO W/O SCOPE: CPT

## 2025-05-09 PROCEDURE — 96374 THER/PROPH/DIAG INJ IV PUSH: CPT

## 2025-05-09 PROCEDURE — 85025 COMPLETE CBC W/AUTO DIFF WBC: CPT

## 2025-05-09 PROCEDURE — 96375 TX/PRO/DX INJ NEW DRUG ADDON: CPT

## 2025-05-09 RX ORDER — MORPHINE SULFATE 4 MG/ML
2 INJECTION, SOLUTION INTRAMUSCULAR; INTRAVENOUS
Status: COMPLETED | OUTPATIENT
Start: 2025-05-09 | End: 2025-05-09

## 2025-05-09 RX ORDER — GLUCAGON 1 MG
1 KIT INJECTION
Status: DISCONTINUED | OUTPATIENT
Start: 2025-05-09 | End: 2025-05-13 | Stop reason: HOSPADM

## 2025-05-09 RX ORDER — IBUPROFEN 200 MG
16 TABLET ORAL
Status: DISCONTINUED | OUTPATIENT
Start: 2025-05-09 | End: 2025-05-13 | Stop reason: HOSPADM

## 2025-05-09 RX ORDER — TALC
6 POWDER (GRAM) TOPICAL NIGHTLY PRN
Status: DISCONTINUED | OUTPATIENT
Start: 2025-05-09 | End: 2025-05-13 | Stop reason: HOSPADM

## 2025-05-09 RX ORDER — ACETAMINOPHEN 500 MG
500 TABLET ORAL
Status: COMPLETED | OUTPATIENT
Start: 2025-05-09 | End: 2025-05-09

## 2025-05-09 RX ORDER — SODIUM CHLORIDE 0.9 % (FLUSH) 0.9 %
10 SYRINGE (ML) INJECTION EVERY 12 HOURS PRN
Status: DISCONTINUED | OUTPATIENT
Start: 2025-05-09 | End: 2025-05-13 | Stop reason: HOSPADM

## 2025-05-09 RX ORDER — MORPHINE SULFATE 4 MG/ML
2 INJECTION, SOLUTION INTRAMUSCULAR; INTRAVENOUS EVERY 4 HOURS PRN
Status: DISCONTINUED | OUTPATIENT
Start: 2025-05-09 | End: 2025-05-13

## 2025-05-09 RX ORDER — MORPHINE SULFATE 4 MG/ML
4 INJECTION, SOLUTION INTRAMUSCULAR; INTRAVENOUS EVERY 4 HOURS PRN
Status: DISCONTINUED | OUTPATIENT
Start: 2025-05-09 | End: 2025-05-13

## 2025-05-09 RX ORDER — ACETAMINOPHEN 325 MG/1
650 TABLET ORAL EVERY 4 HOURS PRN
Status: DISCONTINUED | OUTPATIENT
Start: 2025-05-09 | End: 2025-05-10

## 2025-05-09 RX ORDER — SODIUM CHLORIDE 9 MG/ML
INJECTION, SOLUTION INTRAVENOUS CONTINUOUS
Status: ACTIVE | OUTPATIENT
Start: 2025-05-09 | End: 2025-05-10

## 2025-05-09 RX ORDER — NALOXONE HCL 0.4 MG/ML
0.02 VIAL (ML) INJECTION
Status: DISCONTINUED | OUTPATIENT
Start: 2025-05-09 | End: 2025-05-13 | Stop reason: HOSPADM

## 2025-05-09 RX ORDER — POTASSIUM CHLORIDE 20 MEQ/1
20 TABLET, EXTENDED RELEASE ORAL ONCE
Status: DISCONTINUED | OUTPATIENT
Start: 2025-05-09 | End: 2025-05-10

## 2025-05-09 RX ORDER — ONDANSETRON HYDROCHLORIDE 2 MG/ML
4 INJECTION, SOLUTION INTRAVENOUS
Status: COMPLETED | OUTPATIENT
Start: 2025-05-09 | End: 2025-05-09

## 2025-05-09 RX ORDER — ONDANSETRON HYDROCHLORIDE 2 MG/ML
4 INJECTION, SOLUTION INTRAVENOUS EVERY 8 HOURS PRN
Status: DISCONTINUED | OUTPATIENT
Start: 2025-05-09 | End: 2025-05-13 | Stop reason: HOSPADM

## 2025-05-09 RX ORDER — IBUPROFEN 200 MG
24 TABLET ORAL
Status: DISCONTINUED | OUTPATIENT
Start: 2025-05-09 | End: 2025-05-13 | Stop reason: HOSPADM

## 2025-05-09 RX ORDER — CEFTRIAXONE 1 G/1
1 INJECTION, POWDER, FOR SOLUTION INTRAMUSCULAR; INTRAVENOUS
Status: COMPLETED | OUTPATIENT
Start: 2025-05-09 | End: 2025-05-09

## 2025-05-09 RX ORDER — CEFTRIAXONE 2 G/1
2 INJECTION, POWDER, FOR SOLUTION INTRAMUSCULAR; INTRAVENOUS
Status: DISCONTINUED | OUTPATIENT
Start: 2025-05-10 | End: 2025-05-11

## 2025-05-09 RX ADMIN — SODIUM CHLORIDE: 9 INJECTION, SOLUTION INTRAVENOUS at 02:05

## 2025-05-09 RX ADMIN — ACETAMINOPHEN 650 MG: 325 TABLET ORAL at 08:05

## 2025-05-09 RX ADMIN — MORPHINE SULFATE 2 MG: 4 INJECTION INTRAVENOUS at 12:05

## 2025-05-09 RX ADMIN — IOHEXOL 75 ML: 350 INJECTION, SOLUTION INTRAVENOUS at 11:05

## 2025-05-09 RX ADMIN — SODIUM CHLORIDE 1000 ML: 9 INJECTION, SOLUTION INTRAVENOUS at 10:05

## 2025-05-09 RX ADMIN — ACETAMINOPHEN 500 MG: 500 TABLET ORAL at 10:05

## 2025-05-09 RX ADMIN — CEFTRIAXONE 1 G: 1 INJECTION, POWDER, FOR SOLUTION INTRAMUSCULAR; INTRAVENOUS at 10:05

## 2025-05-09 RX ADMIN — SODIUM CHLORIDE 1000 ML: 9 INJECTION, SOLUTION INTRAVENOUS at 11:05

## 2025-05-09 RX ADMIN — ONDANSETRON 4 MG: 2 INJECTION INTRAMUSCULAR; INTRAVENOUS at 10:05

## 2025-05-09 RX ADMIN — Medication 6 MG: at 08:05

## 2025-05-09 NOTE — H&P
[facility]  History & Physical    Patient Name: Von Uribe  MRN: [id number]  Patient Class: IP- Inpatient  Admission Date: 5/9/2025  Attending Physician: Angelo Lazo MD   Primary Care Provider: [facility]         Patient information was obtained from patient, past medical records, and ER records.     Subjective:     Principal Problem:Pyelonephritis    Chief Complaint:   Chief Complaint   Patient presents with    Dysuria     Pt to Er with reports of dysuria and frequency with lower back pain and N/V x 3 days. Pt denies taking medications for symptoms         HPI: Von Uribe is a 44 y.o. with past medical history of uterine fibroids presents to the hospital with complaints of worsening dysuria, urinary frequency, and left lower back pain with the associated nausea and vomiting for the past 3 days.    Patient reports several episodes of emesis and diarrhea and unable to tolerate p.o..  Patient also endorses subjective fever/chills.  Per chart review, patient tested positive for gonorrhea on 04/22/2025 and was treated with Rocephin.  Patient states she has a history of frequent UTIs.  Patient denies any other alleviating exacerbating factors.  Patient denies headache, chest pain, shortness on breath, abdominal pain, vaginal discharge, hematemesis, hematochezia, hematuria, or any other associated symptoms.    In the ED: Patient afebrile with leukocytosis (WBC 22.92), hemodynamically stable on presentation, H and H 10.2/32, MCV 75, slightly elevated procalcitonin 1.05, beta HCG negative, potassium 3.1, sodium 132, ALT 9. UA with 3+ ketone 2+ blood, 3+ leukocyte esterase, WBC >100.  Urine culture pending.  CT abdomen pelvis shows left perinephric and periureteral inflammation concerning for left renal infection/pyelonephritis with the possibility of a renal abscess.  Patient given acetaminophen 500 mg, IV Rocephin 1 g, IV morphine 2 mg, IV  morphine 4 mg, 1 L NS bolus x2 in the ED. Case discussed with ED provider and patient admitted to hospital medicine for further medical management.    Past Medical History:   Diagnosis Date    Uterine fibroid        Past Surgical History:   Procedure Laterality Date    tubaligation         Review of patient's allergies indicates:  No Known Allergies    No current facility-administered medications on file prior to encounter.     Current Outpatient Medications on File Prior to Encounter   Medication Sig    acetaminophen (TYLENOL) 500 MG tablet Take 1 tablet (500 mg total) by mouth every 6 (six) hours as needed.    acetaminophen (TYLENOL) 650 MG TbSR Take 1 tablet (650 mg total) by mouth every 8 (eight) hours.    ibuprofen (ADVIL,MOTRIN) 600 MG tablet Take 1 tablet (600 mg total) by mouth every 6 (six) hours as needed for Pain.    ibuprofen (ADVIL,MOTRIN) 600 MG tablet Take 1 tablet (600 mg total) by mouth every 6 (six) hours as needed for Pain.     Family History       Problem Relation (Age of Onset)    Cancer Maternal Grandmother    Dementia Maternal Grandmother    Diabetes Maternal Grandmother    Hypertension Maternal Grandmother, Mother          Tobacco Use    Smoking status: Former     Current packs/day: 0.50     Types: Cigarettes    Smokeless tobacco: Never    Tobacco comments:     2-3 cigs daily   Substance and Sexual Activity    Alcohol use: No    Drug use: Yes     Types: Marijuana    Sexual activity: Yes     Partners: Male     Birth control/protection: See Surgical Hx     Review of Systems   Constitutional:  Positive for chills and fever.   HENT: Negative.     Respiratory: Negative.     Cardiovascular: Negative.    Gastrointestinal:  Positive for abdominal pain, nausea (Resolved) and vomiting (Resolved).   Genitourinary:  Positive for dysuria, flank pain and frequency. Negative for vaginal discharge.   Musculoskeletal:  Positive for back pain (Left lower back).   Skin: Negative.    Neurological:  Positive for  weakness.   Psychiatric/Behavioral: Negative.       Objective:     Vital Signs (Most Recent):  Temp: (S) 99.3 °F (37.4 °C) (05/09/25 1237)  Pulse: 74 (05/09/25 1634)  Resp: 18 (05/09/25 1246)  BP: (!) 110/59 (05/09/25 1246)  SpO2: 99 % (05/09/25 1246) Vital Signs (24h Range):  Temp:  [99.3 °F (37.4 °C)-100 °F (37.8 °C)] 99.3 °F (37.4 °C)  Pulse:  [70-75] 74  Resp:  [18] 18  SpO2:  [96 %-100 %] 99 %  BP: (109-123)/(59-67) 110/59     Weight: 64.4 kg (142 lb)  Body mass index is 28.68 kg/m².     Physical Exam  Constitutional:       General: She is not in acute distress.     Appearance: Normal appearance. She is not ill-appearing or diaphoretic.   HENT:      Head: Normocephalic and atraumatic.      Mouth/Throat:      Mouth: Mucous membranes are moist.   Eyes:      Extraocular Movements: Extraocular movements intact.   Cardiovascular:      Rate and Rhythm: Normal rate and regular rhythm.      Pulses: Normal pulses.   Pulmonary:      Effort: Pulmonary effort is normal. No respiratory distress.      Comments: Speaking in full sentences on RA  Abdominal:      General: Abdomen is flat.      Tenderness: There is abdominal tenderness (Left-sided periumbilical tenderness). There is left CVA tenderness. There is no guarding or rebound.      Comments: Mostly left-sided periumbilical tenderness and left CVA tenderness.  No rebound or guarding noted   Musculoskeletal:      Right lower leg: No edema.      Left lower leg: No edema.   Skin:     General: Skin is warm.   Neurological:      General: No focal deficit present.      Mental Status: She is alert and oriented to person, place, and time. Mental status is at baseline.   Psychiatric:         Mood and Affect: Mood normal.         Behavior: Behavior normal.                Significant Labs: All pertinent labs within the past 24 hours have been reviewed.  Bilirubin:   Recent Labs   Lab 05/09/25  1022   BILITOT 0.9     BMP:   Recent Labs   Lab 05/09/25  1022   *   *   K  3.1*      CO2 20*   BUN 12   CREATININE 0.8   CALCIUM 8.9     CBC:   Recent Labs   Lab 05/09/25  1022   WBC 22.92*   HGB 10.2*   HCT 32.0*        CMP:   Recent Labs   Lab 05/09/25  1022   *   K 3.1*      CO2 20*   *   BUN 12   CREATININE 0.8   CALCIUM 8.9   PROT 7.6   ALBUMIN 3.2*   BILITOT 0.9   ALKPHOS 86   AST 13   ALT 9*   ANIONGAP 10     Lipase:   Recent Labs   Lab 05/09/25  1022   LIPASE <3*     Urine Studies:   Recent Labs   Lab 05/09/25  0952   COLORU Yellow   APPEARANCEUA Cloudy*   PHUR 7.0   SPECGRAV 1.025   PROTEINUA 2+*   GLUCUA Negative   BILIRUBINUA Negative   OCCULTUA 2+*   NITRITE Negative   UROBILINOGEN 2.0-3.0*   LEUKOCYTESUR 3+*   RBCUA 91*   WBCUA >100*   BACTERIA Rare   HYALINECASTS 0       Significant Imaging: I have reviewed all pertinent imaging results/findings within the past 24 hours.  Imaging Results              US Retroperitoneal Complete (Final result)  Result time 05/09/25 15:49:29      Final result by Derek Munoz MD (05/09/25 15:49:29)                   Impression:      Soft tissue appearing lesion within the lower pole of the left kidney.  Although there is hyperemia in the region, no definite internal vascularity.  Infectious focus such as abscess could present in this fashion versus blood products within a cyst.  The overall appearance is similar in echotexture to the surrounding renal parenchyma, no hypoechoic internal fluid.  Given the configuration, solid mass would be difficult to definitively exclude.  Short-term follow-up is recommended, no greater than 3 weeks, to assess evolution.      Electronically signed by: Derek Munoz MD  Date:    05/09/2025  Time:    15:49               Narrative:    EXAMINATION:  US RETROPERITONEAL COMPLETE    CLINICAL HISTORY:  possible left renal abscess;    TECHNIQUE:  Ultrasound of the kidneys and urinary bladder was performed including color flow and Doppler evaluation of the  kidneys.    COMPARISON:  CT abdomen and pelvis 05/09/2025    FINDINGS:  Right kidney: The right kidney measures 11.9 cm. No cortical thinning. No loss of corticomedullary distinction. Resistive index measures 0.63.  No mass. No renal stone. No hydronephrosis.    Left kidney: The left kidney measures 10.9 cm. No cortical thinning. No loss of corticomedullary distinction. Resistive index measures 0.68.  There is a soft tissue appearing lesion within the lower pole of the left kidney measuring approximately 3.7 x 2.6 x 2.5 cm.  No renal stone. No hydronephrosis.    The bladder is partially distended at the time of scanning and has an unremarkable appearance.                                        CT Abdomen Pelvis With IV Contrast NO Oral Contrast (Final result)  Result time 05/09/25 11:48:27      Final result by Derek Munoz MD (05/09/25 11:48:27)                   Impression:      This report was flagged in Epic as abnormal.    1. Left perinephric and periureteral inflammation noting low attenuating focus within the interpolar region of the left kidney.  Findings are concerning for left renal infection/pyelonephritis.  Left renal abscess is of concern.  Correlation and follow-up is advised.  2. Liquid stool within the right colon noting a few fluid-filled small bowel loops.  Correlation with any history of diarrheal illness as enteritis is a consideration.  3. Hepatomegaly, correlation with LFTs recommended.  4. Please see above for several additional findings.      Electronically signed by: Derek Munoz MD  Date:    05/09/2025  Time:    11:48               Narrative:    EXAMINATION:  CT ABDOMEN PELVIS WITH IV CONTRAST    CLINICAL HISTORY:  Abdominal pain, acute, nonlocalized;    TECHNIQUE:  Low dose axial images, sagittal and coronal reformations were obtained from the lung bases to the pubic symphysis following the IV administration of 75 mL of Omnipaque 350 .  Oral contrast was not  given.    COMPARISON:  None.    FINDINGS:  Images of the lower thorax are remarkable for mild dependent atelectasis.    The liver is prominent, correlation with LFTs recommended.  The spleen, pancreas, gallbladder and adrenal glands are grossly unremarkable.  The stomach is nondistended, no gallbladder wall thickening.  The portal vein, splenic vein, proximal SMV, celiac axis and SMA all are patent.  There is a minimal hiatal hernia.  No significant abdominal lymphadenopathy.    The kidneys enhance symmetrically.  There is no right hydronephrosis or right nephrolithiasis.  The right ureter is unremarkable without calculi seen.  There is left perinephric and periureteral inflammation noting mild prominence of the left renal collecting system.  There is left urothelial thickening and enhancement.  There is a low attenuating lesion within the interpolar region of the left kidney measuring 3.1 cm.  No left ureteral calculi seen.  The urinary bladder is unremarkable.  The uterus and adnexa are grossly unremarkable noting uterine leiomyoma.  No significant free fluid in the pelvis.    The large bowel is remarkable for scattered liquid stool, particularly within the transverse colon and right colon.  The terminal ileum is unremarkable.  The appendix is not confidently identified, no pericecal inflammation.  There are a few scattered fluid-filled distal small bowel loops.  There are a few scattered shotty periaortic, pericaval, and mesenteric lymph nodes.  There is atherosclerotic calcification of the aorta and its branches.    There are degenerative changes of the spine.  No significant inguinal lymphadenopathy.                                      Assessment/Plan:     Assessment & Plan  Pyelonephritis  Patient presents with subjective fever/chills, dysuria, urinary frequency/urgency, left CVA tenderness.  UA with 3+ leukocyte esterase, WBC and bacteria.  CT abdomen pelvis concerning for left pyelonephritis with abscess  [place] concerned.  Patient given IV Rocephin and IVF in the ED with p.r.n. analgesics.     Plan:  Urine cultures pending  IVF  cc/hour  Continue IV Rocephin 2 g daily  P.r.n. analgesics and antiemetics ordered  Urology consulted:  Renal ultrasound not concerning for kidney abscess as of yet.  Recommendations to continue IV antibiotics x2 weeks and follow up urine cultures.  If abscess identified, then IR consult for drain placement  Discussed case with IR who stated drainage not warranted at this time but will need follow up imaging after antibiotic treatment  Overweight (BMI 25.0-29.9)  Body mass index is 28.68 kg/m². Overweight complicates all aspects of disease management from diagnostic modalities to treatment. Weight loss encouraged and health benefits explained to patient.     Leukocytosis  -See plan above for pyelonephritis   Hypokalemia  Patient's most recent potassium results are listed below.   Recent Labs     05/09/25  1022   K 3.1*     Plan  - Replete potassium per protocol  - Monitor potassium Daily  - Patient's hypokalemia is stable  VTE Risk Mitigation (From admission, onward)           Ordered     IP VTE LOW RISK PATIENT  Once         05/09/25 1347     Place sequential compression device  Until discontinued         05/09/25 1347                       As clarification, on 5/9/2025 patient should be admitted to inpatient services under my care in collaboration with Angelo Lazo MD. MARKIE Eaton PA-C  Department of Hospital Medicine  [facility] [place] [facility] S[facility]

## 2025-05-09 NOTE — NURSING
Report received and care assumed. Discussed plan of care and safety with patient . Reviewed call system. No acute distress noted [facility] [place]  Nurses Note -- 4 Eyes      5/9/2025       Skin assessed on: Admit      [x] No Pressure Injuries Present    []Prevention Measures Documented    [] Yes LDA  for Pressure Injury Previously documented     [] Yes New Pressure Injury Discovered   [] LDA for New Pressure Injury Added      Attending RN:  Suyapa Tellez, RN     [facility]

## 2025-05-09 NOTE — ASSESSMENT & PLAN NOTE
Patient's most recent potassium results are listed below.   Recent Labs     05/09/25  1022   K 3.1*     Plan  - Replete potassium per protocol  - Monitor potassium Daily  - Patient's hypokalemia is stable

## 2025-05-09 NOTE — SUBJECTIVE & OBJECTIVE
Past Medical History:   Diagnosis Date    Uterine fibroid        Past Surgical History:   Procedure Laterality Date    tubaligation         Review of patient's allergies indicates:  No Known Allergies    Family History       Problem Relation (Age of Onset)    Cancer Maternal Grandmother    Dementia Maternal Grandmother    Diabetes Maternal Grandmother    Hypertension Maternal Grandmother, Mother            Tobacco Use    Smoking status: Former     Current packs/day: 0.50     Types: Cigarettes    Smokeless tobacco: Never    Tobacco comments:     2-3 cigs daily   Substance and Sexual Activity    Alcohol use: No    Drug use: Yes     Types: Marijuana    Sexual activity: Yes     Partners: Male     Birth control/protection: See Surgical Hx       Review of Systems   Constitutional:  Positive for chills, fatigue and fever.   HENT:  Negative for congestion.    Respiratory:  Negative for chest tightness and shortness of breath.    Cardiovascular:  Negative for chest pain.   Gastrointestinal:  Negative for abdominal distention, constipation, diarrhea, nausea and vomiting.   Genitourinary:  Positive for dysuria, flank pain, frequency and urgency. Negative for difficulty urinating, hematuria and pelvic pain.   Musculoskeletal:  Negative for arthralgias.   Skin:  Negative for rash.   Neurological:  Negative for dizziness.   Psychiatric/Behavioral:  Negative for confusion.        Objective:     Temp:  [99.3 °F (37.4 °C)-100 °F (37.8 °C)] 99.3 °F (37.4 °C)  Pulse:  [70-75] 70  Resp:  [18] 18  SpO2:  [96 %-100 %] 99 %  BP: (109-123)/(59-67) 110/59  Weight: 64.4 kg (142 lb)  Body mass index is 28.68 kg/m².    Date 05/09/25 0700 - 05/10/25 0659   Shift 6869-7285 0585-0905 1111-6107 24 Hour Total   INTAKE   IV Piggyback 1999 1999   Shift Total(mL/kg) 1999(31)   1999(31)   OUTPUT   Shift Total(mL/kg)       Weight (kg) 64.4 64.4 64.4 64.4          Drains       None                    Physical Exam  Vitals and nursing note reviewed.  "  Constitutional:       Appearance: She is well-developed. She is ill-appearing.   HENT:      Head: Normocephalic.   Eyes:      Conjunctiva/sclera: Conjunctivae normal.   Neck:      Thyroid: No thyromegaly.      Trachea: No tracheal deviation.   Cardiovascular:      Rate and Rhythm: Normal rate.      Pulses: Normal pulses.      Heart sounds: Normal heart sounds.   Pulmonary:      Effort: Pulmonary effort is normal. No respiratory distress.      Breath sounds: Normal breath sounds. No wheezing.   Abdominal:      General: There is no distension.      Palpations: Abdomen is soft. There is no mass.      Tenderness: There is no abdominal tenderness. There is left CVA tenderness. There is no guarding or rebound.      Hernia: No hernia is present.   Musculoskeletal:         General: No tenderness. Normal range of motion.      Cervical back: Normal range of motion.   Lymphadenopathy:      Cervical: No cervical adenopathy.   Skin:     General: Skin is warm and dry.      Findings: No erythema or rash.   Neurological:      Mental Status: She is alert and oriented to person, place, and time.   Psychiatric:         Behavior: Behavior normal.         Thought Content: Thought content normal.         Judgment: Judgment normal.          Significant Labs:    BMP:  Recent Labs   Lab 05/09/25  1022   *   K 3.1*      CO2 20*   BUN 12   CREATININE 0.8   CALCIUM 8.9       CBC:  Recent Labs   Lab 05/09/25  1022   WBC 22.92*   HGB 10.2*   HCT 32.0*          Blood Culture: No results for input(s): "LABBLOO" in the last 168 hours.  Urine Culture: No results for input(s): "LABURIN" in the last 168 hours.    Significant Imaging:  CT: I have reviewed all results within the past 24 hours and my personal findings are:  Left perinephric stranding, area in the mid pole of the kidney with decreased attenuation.                  "

## 2025-05-09 NOTE — SUBJECTIVE & OBJECTIVE
Past Medical History:   Diagnosis Date    Uterine fibroid        Past Surgical History:   Procedure Laterality Date    tubaligation         Review of patient's allergies indicates:  No Known Allergies    No current facility-administered medications on file prior to encounter.     Current Outpatient Medications on File Prior to Encounter   Medication Sig    acetaminophen (TYLENOL) 500 MG tablet Take 1 tablet (500 mg total) by mouth every 6 (six) hours as needed.    acetaminophen (TYLENOL) 650 MG TbSR Take 1 tablet (650 mg total) by mouth every 8 (eight) hours.    ibuprofen (ADVIL,MOTRIN) 600 MG tablet Take 1 tablet (600 mg total) by mouth every 6 (six) hours as needed for Pain.    ibuprofen (ADVIL,MOTRIN) 600 MG tablet Take 1 tablet (600 mg total) by mouth every 6 (six) hours as needed for Pain.     Family History       Problem Relation (Age of Onset)    Cancer Maternal Grandmother    Dementia Maternal Grandmother    Diabetes Maternal Grandmother    Hypertension Maternal Grandmother, Mother          Tobacco Use    Smoking status: Former     Current packs/day: 0.50     Types: Cigarettes    Smokeless tobacco: Never    Tobacco comments:     2-3 cigs daily   Substance and Sexual Activity    Alcohol use: No    Drug use: Yes     Types: Marijuana    Sexual activity: Yes     Partners: Male     Birth control/protection: See Surgical Hx     Review of Systems   Constitutional:  Positive for chills and fever.   HENT: Negative.     Respiratory: Negative.     Cardiovascular: Negative.    Gastrointestinal:  Positive for abdominal pain, nausea (Resolved) and vomiting (Resolved).   Genitourinary:  Positive for dysuria, flank pain and frequency. Negative for vaginal discharge.   Musculoskeletal:  Positive for back pain (Left lower back).   Skin: Negative.    Neurological:  Positive for weakness.   Psychiatric/Behavioral: Negative.       Objective:     Vital Signs (Most Recent):  Temp: (S) 99.3 °F (37.4 °C) (05/09/25 1237)  Pulse: 74  (05/09/25 1634)  Resp: 18 (05/09/25 1246)  BP: (!) 110/59 (05/09/25 1246)  SpO2: 99 % (05/09/25 1246) Vital Signs (24h Range):  Temp:  [99.3 °F (37.4 °C)-100 °F (37.8 °C)] 99.3 °F (37.4 °C)  Pulse:  [70-75] 74  Resp:  [18] 18  SpO2:  [96 %-100 %] 99 %  BP: (109-123)/(59-67) 110/59     Weight: 64.4 kg (142 lb)  Body mass index is 28.68 kg/m².     Physical Exam  Constitutional:       General: She is not in acute distress.     Appearance: Normal appearance. She is not ill-appearing or diaphoretic.   HENT:      Head: Normocephalic and atraumatic.      Mouth/Throat:      Mouth: Mucous membranes are moist.   Eyes:      Extraocular Movements: Extraocular movements intact.   Cardiovascular:      Rate and Rhythm: Normal rate and regular rhythm.      Pulses: Normal pulses.   Pulmonary:      Effort: Pulmonary effort is normal. No respiratory distress.      Comments: Speaking in full sentences on RA  Abdominal:      General: Abdomen is flat.      Tenderness: There is abdominal tenderness (Left-sided periumbilical tenderness). There is left CVA tenderness. There is no guarding or rebound.      Comments: Mostly left-sided periumbilical tenderness and left CVA tenderness.  No rebound or guarding noted   Musculoskeletal:      Right lower leg: No edema.      Left lower leg: No edema.   Skin:     General: Skin is warm.   Neurological:      General: No focal deficit present.      Mental Status: She is alert and oriented to person, place, and time. Mental status is at baseline.   Psychiatric:         Mood and Affect: Mood normal.         Behavior: Behavior normal.                Significant Labs: All pertinent labs within the past 24 hours have been reviewed.  Bilirubin:   Recent Labs   Lab 05/09/25  1022   BILITOT 0.9     BMP:   Recent Labs   Lab 05/09/25  1022   *   *   K 3.1*      CO2 20*   BUN 12   CREATININE 0.8   CALCIUM 8.9     CBC:   Recent Labs   Lab 05/09/25  1022   WBC 22.92*   HGB 10.2*   HCT 32.0*   PLT  239     CMP:   Recent Labs   Lab 05/09/25  1022   *   K 3.1*      CO2 20*   *   BUN 12   CREATININE 0.8   CALCIUM 8.9   PROT 7.6   ALBUMIN 3.2*   BILITOT 0.9   ALKPHOS 86   AST 13   ALT 9*   ANIONGAP 10     Lipase:   Recent Labs   Lab 05/09/25  1022   LIPASE <3*     Urine Studies:   Recent Labs   Lab 05/09/25  0952   COLORU Yellow   APPEARANCEUA Cloudy*   PHUR 7.0   SPECGRAV 1.025   PROTEINUA 2+*   GLUCUA Negative   BILIRUBINUA Negative   OCCULTUA 2+*   NITRITE Negative   UROBILINOGEN 2.0-3.0*   LEUKOCYTESUR 3+*   RBCUA 91*   WBCUA >100*   BACTERIA Rare   HYALINECASTS 0       Significant Imaging: I have reviewed all pertinent imaging results/findings within the past 24 hours.  Imaging Results              US Retroperitoneal Complete (Final result)  Result time 05/09/25 15:49:29      Final result by Derek Munoz MD (05/09/25 15:49:29)                   Impression:      Soft tissue appearing lesion within the lower pole of the left kidney.  Although there is hyperemia in the region, no definite internal vascularity.  Infectious focus such as abscess could present in this fashion versus blood products within a cyst.  The overall appearance is similar in echotexture to the surrounding renal parenchyma, no hypoechoic internal fluid.  Given the configuration, solid mass would be difficult to definitively exclude.  Short-term follow-up is recommended, no greater than 3 weeks, to assess evolution.      Electronically signed by: Derek Munoz MD  Date:    05/09/2025  Time:    15:49               Narrative:    EXAMINATION:  US RETROPERITONEAL COMPLETE    CLINICAL HISTORY:  possible left renal abscess;    TECHNIQUE:  Ultrasound of the kidneys and urinary bladder was performed including color flow and Doppler evaluation of the kidneys.    COMPARISON:  CT abdomen and pelvis 05/09/2025    FINDINGS:  Right kidney: The right kidney measures 11.9 cm. No cortical thinning. No loss of corticomedullary  distinction. Resistive index measures 0.63.  No mass. No renal stone. No hydronephrosis.    Left kidney: The left kidney measures 10.9 cm. No cortical thinning. No loss of corticomedullary distinction. Resistive index measures 0.68.  There is a soft tissue appearing lesion within the lower pole of the left kidney measuring approximately 3.7 x 2.6 x 2.5 cm.  No renal stone. No hydronephrosis.    The bladder is partially distended at the time of scanning and has an unremarkable appearance.                                        CT Abdomen Pelvis With IV Contrast NO Oral Contrast (Final result)  Result time 05/09/25 11:48:27      Final result by Derek Munoz MD (05/09/25 11:48:27)                   Impression:      This report was flagged in Epic as abnormal.    1. Left perinephric and periureteral inflammation noting low attenuating focus within the interpolar region of the left kidney.  Findings are concerning for left renal infection/pyelonephritis.  Left renal abscess is of concern.  Correlation and follow-up is advised.  2. Liquid stool within the right colon noting a few fluid-filled small bowel loops.  Correlation with any history of diarrheal illness as enteritis is a consideration.  3. Hepatomegaly, correlation with LFTs recommended.  4. Please see above for several additional findings.      Electronically signed by: Derek Munoz MD  Date:    05/09/2025  Time:    11:48               Narrative:    EXAMINATION:  CT ABDOMEN PELVIS WITH IV CONTRAST    CLINICAL HISTORY:  Abdominal pain, acute, nonlocalized;    TECHNIQUE:  Low dose axial images, sagittal and coronal reformations were obtained from the lung bases to the pubic symphysis following the IV administration of 75 mL of Omnipaque 350 .  Oral contrast was not given.    COMPARISON:  None.    FINDINGS:  Images of the lower thorax are remarkable for mild dependent atelectasis.    The liver is prominent, correlation with LFTs recommended.  The spleen,  pancreas, gallbladder and adrenal glands are grossly unremarkable.  The stomach is nondistended, no gallbladder wall thickening.  The portal vein, splenic vein, proximal SMV, celiac axis and SMA all are patent.  There is a minimal hiatal hernia.  No significant abdominal lymphadenopathy.    The kidneys enhance symmetrically.  There is no right hydronephrosis or right nephrolithiasis.  The right ureter is unremarkable without calculi seen.  There is left perinephric and periureteral inflammation noting mild prominence of the left renal collecting system.  There is left urothelial thickening and enhancement.  There is a low attenuating lesion within the interpolar region of the left kidney measuring 3.1 cm.  No left ureteral calculi seen.  The urinary bladder is unremarkable.  The uterus and adnexa are grossly unremarkable noting uterine leiomyoma.  No significant free fluid in the pelvis.    The large bowel is remarkable for scattered liquid stool, particularly within the transverse colon and right colon.  The terminal ileum is unremarkable.  The appendix is not confidently identified, no pericecal inflammation.  There are a few scattered fluid-filled distal small bowel loops.  There are a few scattered shotty periaortic, pericaval, and mesenteric lymph nodes.  There is atherosclerotic calcification of the aorta and its branches.    There are degenerative changes of the spine.  No significant inguinal lymphadenopathy.

## 2025-05-09 NOTE — ASSESSMENT & PLAN NOTE
Body mass index is 28.68 kg/m². Overweight complicates all aspects of disease management from diagnostic modalities to treatment. Weight loss encouraged and health benefits explained to patient.

## 2025-05-09 NOTE — HPI
Pyelonephritis/Abscess  Binutheresa Uribe is a 44 y.o. woman who started with urinary frequency, urgency, and dysuria about 5 days ago.  The symptoms progressed to left-sided flank pain fever chills over the last couple of days.  She denies any recent  intervention or history of kidney stones.  She did have a recent gyn infection.

## 2025-05-09 NOTE — ED PROVIDER NOTES
Encounter Date: 5/9/2025    SCRIBE #1 NOTE: I, Nevin Nunez, am scribing for, and in the presence of,  Wilma Dallas PA-C. I have scribed the following portions of the note - Other sections scribed: HPI, ROS, PE.       History     Chief Complaint   Patient presents with    Dysuria     Pt to Er with reports of dysuria and frequency with lower back pain and N/V x 3 days. Pt denies taking medications for symptoms      44 y.o. female with PMHx of uterine fibroids, left-sided sciatica, presents for emergent evaluation of dysuria  and frequency X 3 days follow up by 2 days of N/V/D. Patient reports 3-4 episodes of emesis daily as well as 3 episodes of diarrhea, stating she has not been able to tolerate anything p.o. since the onset of these symptoms. States she has also been having chills and subjective fever for 2 days, as well as left-sided lower back pain. Per chart review, pt's OBGYN treated her for gonorrhea with rocephin beginning on 4/22/2025. Patient has not attempted treatment for symptoms at this time.  Denies any known sick contacts.  Patient denies headache, chest pain, shortness of breath, abdominal pain, nausea/vomiting/diarrhea, vaginal discharge, cough, or any other complaints at this time.      The history is provided by the patient and medical records. No  was used.     Review of patient's allergies indicates:  No Known Allergies  Past Medical History:   Diagnosis Date    Uterine fibroid      Past Surgical History:   Procedure Laterality Date    tubaligation       Family History   Problem Relation Name Age of Onset    Cancer Maternal Grandmother          blood    Hypertension Maternal Grandmother      Diabetes Maternal Grandmother      Dementia Maternal Grandmother      Hypertension Mother       Social History[1]  Review of Systems   Constitutional:  Positive for chills and fever.   HENT:  Negative for sore throat.    Eyes:  Negative for photophobia and visual disturbance.    Respiratory:  Negative for cough and shortness of breath.    Cardiovascular:  Negative for chest pain.   Gastrointestinal:  Negative for abdominal pain, diarrhea, nausea and vomiting.   Genitourinary:  Positive for dysuria and frequency. Negative for decreased urine volume, difficulty urinating, flank pain, hematuria, pelvic pain, vaginal discharge and vaginal pain.   Musculoskeletal:  Positive for back pain (L-sided, lower). Negative for myalgias.   Skin:  Negative for rash.   Neurological:  Negative for dizziness, syncope, weakness and headaches.   Psychiatric/Behavioral: Negative.         Physical Exam     Initial Vitals [05/09/25 0942]   BP Pulse Resp Temp SpO2   123/67 72 18 100 °F (37.8 °C) 100 %      MAP       --         Physical Exam    Nursing note and vitals reviewed.  Constitutional: She appears well-developed and well-nourished. She is not diaphoretic. No distress.   HENT:   Head: Normocephalic and atraumatic.   Right Ear: External ear normal.   Left Ear: External ear normal.   Eyes: Conjunctivae and EOM are normal. Pupils are equal, round, and reactive to light. No scleral icterus.   Neck: Neck supple.   Normal range of motion.  Cardiovascular:  Normal rate.           Pulmonary/Chest: No stridor. No respiratory distress.   Abdominal: Abdomen is soft. Bowel sounds are normal. She exhibits no distension. There is no abdominal tenderness.   No suprapubic tenderness, no flank pain, no CVAT  There is no rebound and no guarding.   Musculoskeletal:         General: Normal range of motion.      Cervical back: Normal range of motion and neck supple.      Comments: No midline or paraspinal tenderness.  Negative straight leg raise.  Ambulating without difficulty     Lymphadenopathy:     She has no cervical adenopathy.   Neurological: She is alert and oriented to person, place, and time. She has normal strength.   Skin: No rash noted.   Psychiatric: She has a normal mood and affect. Thought content normal.          ED Course   Procedures  Labs Reviewed   URINALYSIS, REFLEX TO URINE CULTURE - Abnormal       Result Value    Color, UA Yellow      Appearance, UA Cloudy (*)     pH, UA 7.0      Spec Grav UA 1.025      Protein, UA 2+ (*)     Glucose, UA Negative      Ketones, UA 3+ (*)     Bilirubin, UA Negative      Blood, UA 2+ (*)     Nitrites, UA Negative      Urobilinogen, UA 2.0-3.0 (*)     Leukocyte Esterase, UA 3+ (*)    COMPREHENSIVE METABOLIC PANEL - Abnormal    Sodium 132 (*)     Potassium 3.1 (*)     Chloride 102      CO2 20 (*)     Glucose 144 (*)     BUN 12      Creatinine 0.8      Calcium 8.9      Protein Total 7.6      Albumin 3.2 (*)     Bilirubin Total 0.9      ALP 86      AST 13      ALT 9 (*)     Anion Gap 10      eGFR >60     LIPASE - Abnormal    Lipase Level <3 (*)    PROCALCITONIN - Abnormal    Procalcitonin 1.05 (*)    CBC WITH DIFFERENTIAL - Abnormal    WBC 22.92 (*)     RBC 4.28      HGB 10.2 (*)     HCT 32.0 (*)     MCV 75 (*)     MCH 23.8 (*)     MCHC 31.9 (*)     RDW 21.3 (*)     Platelet Count 239      MPV 10.5      Nucleated RBC 0      Neut % 88.2 (*)     Lymph % 3.4 (*)     Mono % 5.8      Eos % 1.0      Basophil % 0.2      Imm Grans % 1.4 (*)     Neut # 20.23 (*)     Lymph # 0.77 (*)     Mono # 1.34 (*)     Eos # 0.22      Baso # 0.04      Imm Grans # 0.32 (*)    URINALYSIS MICROSCOPIC - Abnormal    RBC, UA 91 (*)     WBC, UA >100 (*)     WBC Clumps, UA Moderate (*)     Bacteria, UA Rare      Squamous Epithelial Cells, UA 16      Non-Squamous Epithelial Cells 10 (*)     Hyaline Casts, UA 0      Microscopic Comment       CULTURE, URINE   GREY TOP URINE HOLD    Extra Tube Hold for add-ons.     CBC W/ AUTO DIFFERENTIAL    Narrative:     The following orders were created for panel order CBC auto differential.  Procedure                               Abnormality         Status                     ---------                               -----------         ------                     CBC with  Differential[[id number]]       Abnormal            Final result                 Please view results for these tests on the individual orders.   C. TRACHOMATIS/N. GONORRHOEAE BY AMP DNA   POCT URINE PREGNANCY    POC Preg Test, Ur Negative       Acceptable Yes            Imaging Results               CT Abdomen Pelvis With IV Contrast NO Oral Contrast (Final result)  Result time 05/09/25 11:48:27      Final result by Derek Munoz MD (05/09/25 11:48:27)                   Impression:      This report was flagged in Epic as abnormal.    1. Left perinephric and periureteral inflammation noting low attenuating focus within the interpolar region of the left kidney.  Findings are concerning for left renal infection/pyelonephritis.  Left renal abscess is of concern.  Correlation and follow-up is advised.  2. Liquid stool within the right colon noting a few fluid-filled small bowel loops.  Correlation with any history of diarrheal illness as enteritis is a consideration.  3. Hepatomegaly, correlation with LFTs recommended.  4. Please see above for several additional findings.      Electronically signed by: Derek Munoz MD  Date:    05/09/2025  Time:    11:48               Narrative:    EXAMINATION:  CT ABDOMEN PELVIS WITH IV CONTRAST    CLINICAL HISTORY:  Abdominal pain, acute, nonlocalized;    TECHNIQUE:  Low dose axial images, sagittal and coronal reformations were obtained from the lung bases to the pubic symphysis following the IV administration of 75 mL of Omnipaque 350 .  Oral contrast was not given.    COMPARISON:  None.    FINDINGS:  Images of the lower thorax are remarkable for mild dependent atelectasis.    The liver is prominent, correlation with LFTs recommended.  The spleen, pancreas, gallbladder and adrenal glands are grossly unremarkable.  The stomach is nondistended, no gallbladder wall thickening.  The portal vein, splenic vein, proximal SMV, celiac axis and SMA all are patent.  There  is a minimal hiatal hernia.  No significant abdominal lymphadenopathy.    The kidneys enhance symmetrically.  There is no right hydronephrosis or right nephrolithiasis.  The right ureter is unremarkable without calculi seen.  There is left perinephric and periureteral inflammation noting mild prominence of the left renal collecting system.  There is left urothelial thickening and enhancement.  There is a low attenuating lesion within the interpolar region of the left kidney measuring 3.1 cm.  No left ureteral calculi seen.  The urinary bladder is unremarkable.  The uterus and adnexa are grossly unremarkable noting uterine leiomyoma.  No significant free fluid in the pelvis.    The large bowel is remarkable for scattered liquid stool, particularly within the transverse colon and right colon.  The terminal ileum is unremarkable.  The appendix is not confidently identified, no pericecal inflammation.  There are a few scattered fluid-filled distal small bowel loops.  There are a few scattered shotty periaortic, pericaval, and mesenteric lymph nodes.  There is atherosclerotic calcification of the aorta and its branches.    There are degenerative changes of the spine.  No significant inguinal lymphadenopathy.                                       Medications   sodium chloride 0.9% flush 10 mL (has no administration in time range)   naloxone 0.4 mg/mL injection 0.02 mg (has no administration in time range)   glucose chewable tablet 16 g (has no administration in time range)   glucose chewable tablet 24 g (has no administration in time range)   dextrose 50% injection 12.5 g (has no administration in time range)   dextrose 50% injection 25 g (has no administration in time range)   glucagon (human recombinant) injection 1 mg (has no administration in time range)   acetaminophen tablet 650 mg (has no administration in time range)   ondansetron injection 4 mg (has no administration in time range)   melatonin tablet 6 mg (has no  "administration in time range)   cefTRIAXone injection 2 g (has no administration in time range)   0.9% NaCl infusion ( Intravenous New Bag 5/9/25 1414)   morphine injection 2 mg (has no administration in time range)   morphine injection 4 mg (has no administration in time range)   sodium chloride 0.9% bolus 1,000 mL 1,000 mL (0 mLs Intravenous Stopped 5/9/25 1116)   ondansetron injection 4 mg (4 mg Intravenous Given 5/9/25 1030)   acetaminophen tablet 500 mg (500 mg Oral Given 5/9/25 1058)   cefTRIAXone injection 1 g (1 g Intravenous Given 5/9/25 1030)   sodium chloride 0.9% bolus 1,000 mL 1,000 mL (0 mLs Intravenous Stopped 5/9/25 1234)   morphine injection 2 mg (2 mg Intravenous Given 5/9/25 1235)   iohexoL (OMNIPAQUE 350) injection 75 mL (75 mLs Intravenous Given 5/9/25 1142)     Medical Decision Making  44 y.o. female with PMHx of uterine fibroids presents for emergent evaluation of dysuria and urinary frequency X 3 days followed by 2 days of N/V/D.  Also reports subjective fevers and  left-sided low back pain X 2 days. Hx of  frequent UTI  with similar urinary symptoms but denies ever experiencing GI  symptoms prompting her to report for re-evaluation. Seen by Dr. Finnegan   For annual visit 4/22/[birth year]  and  tested positive for gonorrhea which she was treated with IM Rocephin for 2 days, as well as left-sided lower back pain, dysuria, and urinary frequency for 3 days. Per chart review, pt's OBGYN treated her for gonorrhea with IM  Rocephin.  On exam she appears uncomfortable but no suprapubic, flank or CVAT.     UA >100wbc,  moderate clumps, 91 rbc.  Leukocytosis (22.92). Procal 1.05. No FRANCESCO. CT noting "left perinephric and periureteral inflammation noting low attenuating focus within the interpolar region of the left kidney. Findings are concerning for left renal infection/pyelonephritis.  Left renal abscess is of concern."     ED MEDS ADMINISTERED:  0.9% NaCl infusion ( Intravenous New Bag 5/9/25 1414)  sodium " chloride 0.9% bolus 1,000 mL 1,000 mL (0 mLs Intravenous Stopped 5/9/25 1116)  ondansetron injection 4 mg (4 mg Intravenous Given 5/9/25 1030)  acetaminophen tablet 500 mg (500 mg Oral Given 5/9/25 1058)  cefTRIAXone injection 1 g (1 g Intravenous Given 5/9/25 1030)  sodium chloride 0.9% bolus 1,000 mL 1,000 mL (0 mLs Intravenous Stopped 5/9/25 1234)  morphine injection 2 mg (2 mg Intravenous Given 5/9/25 1235)  iohexoL (OMNIPAQUE 350) injection 75 mL (75 mLs Intravenous Given 5/9/25 1142)     I have consulted Dr. Bullock from the urology service regarding the patient's presentation and study results concerning for left renal abscess but he informed me that treatment is usually deferred to IR.         After review of the patients physical exam, ED testing, and history/symptoms, the patient will be admitted. Crissy Cisneros PA-C with hospital medicine will accept the admission to the inpatient service under the care of Dr. Lazo. Admit orders completed. The diagnosis, treatment and plan for admission were discussed with the patient and understanding was verbalized. All questions or concerns have been addressed.  Patient stable and comfortable at this time with no further complaints.    Amount and/or Complexity of Data Reviewed  External Data Reviewed: labs, radiology and notes.  Labs: ordered. Decision-making details documented in ED Course.  Radiology: ordered. Decision-making details documented in ED Course.    Risk  OTC drugs.  Prescription drug management.  Decision regarding hospitalization.            Scribe Attestation:   Scribe #1: I performed the above scribed service and the documentation accurately describes the services I performed. I attest to the accuracy of the note.        ED Course as of 05/09/25 1448   Fri May 09, 2025   1236 Attempted to contact [facility] urology on-call with no answer [CC]   1248 RBC, UA(!): 91 [CC]   1249 WBC, UA(!): >100 [CC]   1249 WBC Clumps, UA(!): Moderate [CC]   1249 WBC(!):  22.92 [CC]   1249 Sodium(!): 132 [CC]   1249 Potassium(!): 3.1 [CC]   1249 BILIRUBIN TOTAL: 0.9 [CC]   1249 Procalcitonin(!): 1.05 [CC]   1249 hCG Qualitative, Urine: Negative [CC]   1250 CT Abdomen Pelvis With IV Contrast NO Oral Contrast(!)   Left perinephric and periureteral inflammation noting low attenuating focus within the interpolar region of the left kidney.  Findings are concerning for left renal infection/pyelonephritis.  Left renal abscess is of concern.   [CC]   1250 Attempted to contact Urology on-call again, no answer [CC]   1258 Message sent to Dr. Bullock via secure chat [CC]   1301 Dr. Bullock responded stating that renal abscesses or usually deferred to IR for treatment.  We will discuss with Hospital Medicine for admission [CC]      ED Course User Index  [CC] Wilma Dallas PA-C                           Clinical Impression:  Final diagnoses:  [N12] Pyelonephritis (Primary)  [N15.1] Renal abscess, left          ED Disposition Condition    Admit              I, Wilma Dallas PA-C, personally performed the services described in this documentation. All medical record entries made by the scribe were at my direction and in my presence. I have reviewed the chart and agree that the record reflects my personal performance and is accurate and complete.      DISCLAIMER: This note was prepared with [employer] voice recognition transcription software. Garbled syntax, mangled pronouns, and other bizarre constructions may be attributed to that software system.         [1]   Social History  Tobacco Use    Smoking status: Former     Current packs/day: 0.50     Types: Cigarettes    Smokeless tobacco: Never    Tobacco comments:     2-3 cigs daily   Substance Use Topics    Alcohol use: No    Drug use: Yes     Types: Marijuana        Wilma Dallas PA-C  05/09/25 0569

## 2025-05-09 NOTE — ASSESSMENT & PLAN NOTE
Patient presents with subjective fever/chills, dysuria, urinary frequency/urgency, left CVA tenderness.  UA with 3+ leukocyte esterase, WBC and bacteria.  CT abdomen pelvis concerning for left pyelonephritis with abscess Goessel concerned.  Patient given IV Rocephin and IVF in the ED with p.r.n. analgesics.     Plan:  Urine cultures pending  IVF  cc/hour  Continue IV Rocephin 2 g daily  P.r.n. analgesics and antiemetics ordered  Urology consulted:  Renal ultrasound not concerning for kidney abscess as of yet.  Recommendations to continue IV antibiotics x2 weeks and follow up urine cultures.  If abscess identified, then IR consult for drain placement  Discussed case with IR who stated drainage not warranted at this time but will need follow up imaging after antibiotic treatment

## 2025-05-09 NOTE — CONSULTS
[facility] - Emergency Dept  Urology  Consult Note    Patient Name: Von Uribe  MRN: [id number]  Admission Date: 5/9/2025  Hospital Length of Stay: 0   Code Status: Full Code   Attending Provider: Angelo Lazo MD   Consulting Provider: Rigoberto Bullock MD  Primary Care Physician: Todd Torres [facility]  Principal Problem:<principal problem not specified>    Inpatient consult to Urology  Consult performed by: Rigoberto Bullock MD  Consult ordered by: Wilma Dallas PA-C          Subjective:     HPI:  Pyelonephritis/Abscess  Von Uribe is a 44 y.o. woman who started with urinary frequency, urgency, and dysuria about 5 days ago.  The symptoms progressed to left-sided flank pain fever chills over the last couple of days.  She denies any recent  intervention or history of kidney stones.  She did have a recent gyn infection.    Past Medical History:   Diagnosis Date    Uterine fibroid        Past Surgical History:   Procedure Laterality Date    tubaligation         Review of patient's allergies indicates:  No Known Allergies    Family History       Problem Relation (Age of Onset)    Cancer Maternal Grandmother    Dementia Maternal Grandmother    Diabetes Maternal Grandmother    Hypertension Maternal Grandmother, Mother            Tobacco Use    Smoking status: Former     Current packs/day: 0.50     Types: Cigarettes    Smokeless tobacco: Never    Tobacco comments:     2-3 cigs daily   Substance and Sexual Activity    Alcohol use: No    Drug use: Yes     Types: Marijuana    Sexual activity: Yes     Partners: Male     Birth control/protection: See Surgical Hx       Review of Systems   Constitutional:  Positive for chills, fatigue and fever.   HENT:  Negative for congestion.    Respiratory:  Negative for chest tightness and shortness of breath.    Cardiovascular:  Negative for chest pain.   Gastrointestinal:  Negative for abdominal distention, constipation, diarrhea,  nausea and vomiting.   Genitourinary:  Positive for dysuria, flank pain, frequency and urgency. Negative for difficulty urinating, hematuria and pelvic pain.   Musculoskeletal:  Negative for arthralgias.   Skin:  Negative for rash.   Neurological:  Negative for dizziness.   Psychiatric/Behavioral:  Negative for confusion.        Objective:     Temp:  [99.3 °F (37.4 °C)-100 °F (37.8 °C)] 99.3 °F (37.4 °C)  Pulse:  [70-75] 70  Resp:  [18] 18  SpO2:  [96 %-100 %] 99 %  BP: (109-123)/(59-67) 110/59  Weight: 64.4 kg (142 lb)  Body mass index is 28.68 kg/m².    Date 05/09/25 0700 - 05/10/25 0659   Shift 7436-3287 0440-5678 4836-3787 24 Hour Total   INTAKE   IV Piggyback 1999 1999   Shift Total(mL/kg) 1999(31)   1999(31)   OUTPUT   Shift Total(mL/kg)       Weight (kg) 64.4 64.4 64.4 64.4          Drains       None                    Physical Exam  Vitals and nursing note reviewed.   Constitutional:       Appearance: She is well-developed. She is ill-appearing.   HENT:      Head: Normocephalic.   Eyes:      Conjunctiva/sclera: Conjunctivae normal.   Neck:      Thyroid: No thyromegaly.      Trachea: No tracheal deviation.   Cardiovascular:      Rate and Rhythm: Normal rate.      Pulses: Normal pulses.      Heart sounds: Normal heart sounds.   Pulmonary:      Effort: Pulmonary effort is normal. No respiratory distress.      Breath sounds: Normal breath sounds. No wheezing.   Abdominal:      General: There is no distension.      Palpations: Abdomen is soft. There is no mass.      Tenderness: There is no abdominal tenderness. There is left CVA tenderness. There is no guarding or rebound.      Hernia: No hernia is present.   Musculoskeletal:         General: No tenderness. Normal range of motion.      Cervical back: Normal range of motion.   Lymphadenopathy:      Cervical: No cervical adenopathy.   Skin:     General: Skin is warm and dry.      Findings: No erythema or rash.   Neurological:      Mental Status: She is alert and  "oriented to person, place, and time.   Psychiatric:         Behavior: Behavior normal.         Thought Content: Thought content normal.         Judgment: Judgment normal.          Significant Labs:    BMP:  Recent Labs   Lab 05/09/25  1022   *   K 3.1*      CO2 20*   BUN 12   CREATININE 0.8   CALCIUM 8.9       CBC:  Recent Labs   Lab 05/09/25  1022   WBC 22.92*   HGB 10.2*   HCT 32.0*          Blood Culture: No results for input(s): "LABBLOO" in the last 168 hours.  Urine Culture: No results for input(s): "LABURIN" in the last 168 hours.    Significant Imaging:  CT: I have reviewed all results within the past 24 hours and my personal findings are:  Left perinephric stranding, area in the mid pole of the kidney with decreased attenuation.                    Assessment and Plan:     Pyelonephritis  Renal ultrasound to evaluate area of concern for possible abscess   If an abscess identified then Interventional Radiology should be consulted for drain placement.  Follow urine cultures, treat x2 weeks  Rest of care per Hospital Medicine.        VTE Risk Mitigation (From admission, onward)           Ordered     IP VTE LOW RISK PATIENT  Once         05/09/25 1347     Place sequential compression device  Until discontinued         05/09/25 1347                    Thank you for your consult. I will follow-up with patient. Please contact us if you have any additional questions.    Rigoberto Bullock MD  Urology  [facility] - Emergency Dept  "

## 2025-05-09 NOTE — HPI
Von Uribe is a 44 y.o. with past medical history of uterine fibroids presents to the hospital with complaints of worsening dysuria, urinary frequency, and left lower back pain with the associated nausea and vomiting for the past 3 days.    Patient reports several episodes of emesis and diarrhea and unable to tolerate p.o..  Patient also endorses subjective fever/chills.  Per chart review, patient tested positive for gonorrhea on 04/22/2025 and was treated with Rocephin.  Patient states she has a history of frequent UTIs.  Patient denies any other alleviating exacerbating factors.  Patient denies headache, chest pain, shortness on breath, abdominal pain, vaginal discharge, hematemesis, hematochezia, hematuria, or any other associated symptoms.    In the ED: Patient afebrile with leukocytosis (WBC 22.92), hemodynamically stable on presentation, H and H 10.2/32, MCV 75, slightly elevated procalcitonin 1.05, beta HCG negative, potassium 3.1, sodium 132, ALT 9. UA with 3+ ketone 2+ blood, 3+ leukocyte esterase, WBC >100.  Urine culture pending.  CT abdomen pelvis shows left perinephric and periureteral inflammation concerning for left renal infection/pyelonephritis with the possibility of a renal abscess.  Patient given acetaminophen 500 mg, IV Rocephin 1 g, IV morphine 2 mg, IV morphine 4 mg, 1 L NS bolus x2 in the ED. Case discussed with ED provider and patient admitted to hospital medicine for further medical management.

## 2025-05-09 NOTE — ASSESSMENT & PLAN NOTE
Renal ultrasound to evaluate area of concern for possible abscess   If an abscess identified then Interventional Radiology should be consulted for drain placement.  Follow urine cultures, treat x2 weeks  Rest of care per Hospital Medicine.

## 2025-05-09 NOTE — PLAN OF CARE
Case Management Assessment     PCP: [facility]  Pharmacy: [facility]    Patient Arrived From: home   Existing Help at Home: none    Barriers to Discharge: none    Discharge Plan:    A. Home with family   B. Home with family     05/09/25 1638   Discharge Assessment   Assessment Type Discharge Planning Assessment   Confirmed/corrected address, phone number and insurance No   Source of Information patient   Communicated ARTHUR with patient/caregiver Date not available/Unable to determine   Reason For Admission Pyelonephrititis   People in Home parent(s)   Do you expect to return to your current living situation? Yes   Do you have help at home or someone to help you manage your care at home? No   Prior to hospitilization cognitive status: Alert/Oriented   Current cognitive status: Alert/Oriented   Walking or Climbing Stairs Difficulty no   Dressing/Bathing Difficulty no   Equipment Currently Used at Home none   Readmission within 30 days? No   Patient currently being followed by outpatient case management? No   Do you currently have service(s) that help you manage your care at home? No   Do you take prescription medications? Yes   Do you have prescription coverage? Yes   Coverage Medicaid   Do you have any problems affording any of your prescribed medications? No   Is the patient taking medications as prescribed? yes   Who is going to help you get home at discharge? Rosa Medina (Mother)  [phone] (Mobile)   How do you get to doctors appointments? car, drives self;family or friend will provide   Are you on dialysis? No   Do you take coumadin? No   Discharge Plan A Home with family   Discharge Plan B Home with family   DME Needed Upon Discharge  none   Discharge Plan discussed with: Patient   Transition of Care Barriers None   OTHER   Name(s) of People in Home Rosa Medina (Mother)  [phone] (Mobile)

## 2025-05-10 LAB
ABSOLUTE EOSINOPHIL (OHS): 0.01 K/UL
ABSOLUTE MONOCYTE (OHS): 1.62 K/UL (ref 0.3–1)
ABSOLUTE NEUTROPHIL COUNT (OHS): 13.1 K/UL (ref 1.8–7.7)
ALBUMIN SERPL BCP-MCNC: 2.5 G/DL (ref 3.5–5.2)
ALP SERPL-CCNC: 72 UNIT/L (ref 40–150)
ALT SERPL W/O P-5'-P-CCNC: 13 UNIT/L (ref 10–44)
ANION GAP (OHS): 7 MMOL/L (ref 8–16)
AST SERPL-CCNC: 21 UNIT/L (ref 11–45)
BASOPHILS # BLD AUTO: 0.02 K/UL
BASOPHILS NFR BLD AUTO: 0.1 %
BILIRUB SERPL-MCNC: 0.5 MG/DL (ref 0.1–1)
BUN SERPL-MCNC: 5 MG/DL (ref 6–20)
CALCIUM SERPL-MCNC: 7.6 MG/DL (ref 8.7–10.5)
CHLORIDE SERPL-SCNC: 109 MMOL/L (ref 95–110)
CO2 SERPL-SCNC: 20 MMOL/L (ref 23–29)
CREAT SERPL-MCNC: 0.6 MG/DL (ref 0.5–1.4)
ERYTHROCYTE [DISTWIDTH] IN BLOOD BY AUTOMATED COUNT: 21.8 % (ref 11.5–14.5)
GFR SERPLBLD CREATININE-BSD FMLA CKD-EPI: >60 ML/MIN/1.73/M2
GLUCOSE SERPL-MCNC: 98 MG/DL (ref 70–110)
HCT VFR BLD AUTO: 25.7 % (ref 37–48.5)
HGB BLD-MCNC: 8.1 GM/DL (ref 12–16)
IMM GRANULOCYTES # BLD AUTO: 0.13 K/UL (ref 0–0.04)
IMM GRANULOCYTES NFR BLD AUTO: 0.8 % (ref 0–0.5)
LACTATE SERPL-SCNC: 0.7 MMOL/L (ref 0.5–2.2)
LYMPHOCYTES # BLD AUTO: 1.02 K/UL (ref 1–4.8)
MAGNESIUM SERPL-MCNC: 1.9 MG/DL (ref 1.6–2.6)
MCH RBC QN AUTO: 24.2 PG (ref 27–31)
MCHC RBC AUTO-ENTMCNC: 31.5 G/DL (ref 32–36)
MCV RBC AUTO: 77 FL (ref 82–98)
NUCLEATED RBC (/100WBC) (OHS): 0 /100 WBC
PHOSPHATE SERPL-MCNC: 1.4 MG/DL (ref 2.7–4.5)
PLATELET # BLD AUTO: 162 K/UL (ref 150–450)
PMV BLD AUTO: 10.6 FL (ref 9.2–12.9)
POTASSIUM SERPL-SCNC: 2.8 MMOL/L (ref 3.5–5.1)
PROT SERPL-MCNC: 6 GM/DL (ref 6–8.4)
RBC # BLD AUTO: 3.35 M/UL (ref 4–5.4)
RELATIVE EOSINOPHIL (OHS): 0.1 %
RELATIVE LYMPHOCYTE (OHS): 6.4 % (ref 18–48)
RELATIVE MONOCYTE (OHS): 10.2 % (ref 4–15)
RELATIVE NEUTROPHIL (OHS): 82.4 % (ref 38–73)
SODIUM SERPL-SCNC: 136 MMOL/L (ref 136–145)
WBC # BLD AUTO: 15.9 K/UL (ref 3.9–12.7)

## 2025-05-10 PROCEDURE — 99232 SBSQ HOSP IP/OBS MODERATE 35: CPT | Mod: ,,, | Performed by: UROLOGY

## 2025-05-10 PROCEDURE — 25000003 PHARM REV CODE 250

## 2025-05-10 PROCEDURE — 83735 ASSAY OF MAGNESIUM: CPT

## 2025-05-10 PROCEDURE — 85025 COMPLETE CBC W/AUTO DIFF WBC: CPT

## 2025-05-10 PROCEDURE — 63600175 PHARM REV CODE 636 W HCPCS: Performed by: HOSPITALIST

## 2025-05-10 PROCEDURE — 83605 ASSAY OF LACTIC ACID: CPT | Performed by: HOSPITALIST

## 2025-05-10 PROCEDURE — 25000003 PHARM REV CODE 250: Performed by: HOSPITALIST

## 2025-05-10 PROCEDURE — 63600175 PHARM REV CODE 636 W HCPCS

## 2025-05-10 PROCEDURE — 21400001 HC TELEMETRY ROOM

## 2025-05-10 PROCEDURE — 87040 BLOOD CULTURE FOR BACTERIA: CPT | Performed by: HOSPITALIST

## 2025-05-10 PROCEDURE — 84155 ASSAY OF PROTEIN SERUM: CPT

## 2025-05-10 PROCEDURE — 84100 ASSAY OF PHOSPHORUS: CPT

## 2025-05-10 PROCEDURE — 36415 COLL VENOUS BLD VENIPUNCTURE: CPT

## 2025-05-10 PROCEDURE — 36415 COLL VENOUS BLD VENIPUNCTURE: CPT | Performed by: HOSPITALIST

## 2025-05-10 RX ORDER — SODIUM CHLORIDE, SODIUM LACTATE, POTASSIUM CHLORIDE, CALCIUM CHLORIDE 600; 310; 30; 20 MG/100ML; MG/100ML; MG/100ML; MG/100ML
INJECTION, SOLUTION INTRAVENOUS CONTINUOUS
Status: DISCONTINUED | OUTPATIENT
Start: 2025-05-10 | End: 2025-05-12

## 2025-05-10 RX ORDER — ACETAMINOPHEN 325 MG/1
650 TABLET ORAL EVERY 4 HOURS PRN
Status: DISCONTINUED | OUTPATIENT
Start: 2025-05-10 | End: 2025-05-13 | Stop reason: HOSPADM

## 2025-05-10 RX ORDER — IBUPROFEN 400 MG/1
400 TABLET, FILM COATED ORAL EVERY 6 HOURS PRN
Status: DISCONTINUED | OUTPATIENT
Start: 2025-05-10 | End: 2025-05-11

## 2025-05-10 RX ADMIN — CEFTRIAXONE SODIUM 2 G: 2 INJECTION, POWDER, FOR SOLUTION INTRAMUSCULAR; INTRAVENOUS at 09:05

## 2025-05-10 RX ADMIN — ACETAMINOPHEN 650 MG: 325 TABLET ORAL at 11:05

## 2025-05-10 RX ADMIN — SODIUM CHLORIDE, POTASSIUM CHLORIDE, SODIUM LACTATE AND CALCIUM CHLORIDE: 600; 310; 30; 20 INJECTION, SOLUTION INTRAVENOUS at 04:05

## 2025-05-10 RX ADMIN — POTASSIUM BICARBONATE 50 MEQ: 977.5 TABLET, EFFERVESCENT ORAL at 02:05

## 2025-05-10 RX ADMIN — POTASSIUM BICARBONATE 50 MEQ: 977.5 TABLET, EFFERVESCENT ORAL at 11:05

## 2025-05-10 RX ADMIN — SODIUM CHLORIDE: 9 INJECTION, SOLUTION INTRAVENOUS at 04:05

## 2025-05-10 NOTE — ASSESSMENT & PLAN NOTE
Patient's most recent potassium results are listed below.   Recent Labs     05/09/25  1022 05/10/25  0634   K 3.1* 2.8*     Plan  - Replete potassium per protocol  - Monitor potassium Daily  - Patient's hypokalemia is worsening. Will adjust treatment as follows: replace as needed

## 2025-05-10 NOTE — PLAN OF CARE
Problem: Fall Injury Risk  Goal: Absence of Fall and Fall-Related Injury  Outcome: Progressing  Intervention: Identify and Manage Contributors  Flowsheets (Taken 5/10/2025 1839)  Self-Care Promotion: independence encouraged  Medication Review/Management:   medications reviewed   high-risk medications identified  Intervention: Promote Injury-Free Environment  Flowsheets (Taken 5/10/2025 1839)  Safety Promotion/Fall Prevention:   medications reviewed   lighting adjusted   side rails raised x 2   nonskid shoes/socks when out of bed   high risk medications identified      "  2/28/2018      RE: Guy Thornton  [address]  [place] [address]          Pediatric Hematology/Oncology Clinic Note     HPI-  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    Since the last visit, Guy has been doing well. Mother had a recent upper respiratory illness but Guy stayed healthy. Guy reports that his foot orthotics from [facility] have helped with his bilateral ankle valgus. He attended PT at [facility] which helped relieve neck pain. He no longer experiences a \"tingling\" sensation in the fingers of his right hand. Guy has occasional allergic reactions to his new puppy, Yvonne. He reports that he has not had any recent headaches. Currently, Guy's ear is plugged with cerumen and he reports that he will require new hearing aids. Mother reports that Guy's vision has improved to 20/30 with left cataract. Guy has a stable benign nevus on his toe.     There are no other concerns or complaints at this time.     Fam/Soc: Guy attends college at [employer] in [place]. 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 Guy is currently taking a dance class. He will take a Western Civilization course and math course this coming semester. The family recently got a puppy named Yvonne.     History was obtained from patient and patient's mother.    Allergies   Allergen Reactions     Bacitracin Blisters     Chloral Hydrate Other (See Comments)     Patient becomes aggressive and hallucinations       Current Outpatient Prescriptions   Medication     methylphenidate (RITALIN) 5 MG tablet     albuterol (2.5 MG/3ML) 0.083% nebulizer solution     albuterol (PROAIR HFA, PROVENTIL HFA, VENTOLIN HFA) 108 (90 BASE) MCG/ACT inhaler     methylphenidate (RITALIN) 5 MG tablet     methylphenidate (RITALIN) 5 MG tablet     Cholecalciferol (VITAMIN D PO)     No current facility-administered medications for " "this visit.        Past Medical History:   Diagnosis Date     Asthma      Hearing loss of left ear      Neurofibromatosis (H)        Past Surgical History:   Procedure Laterality Date     BACK SURGERY       ear surgeries      3 tympanomastoidectomies       Family History   Problem Relation Age of Onset     Unknown/Adopted Mother      Unknown/Adopted Father      Unknown/Adopted Maternal Grandmother      Unknown/Adopted Maternal Grandfather      Review of Systems   HENT: Positive for hearing loss.         Guy's ear is plugged with cerumen and he reports that he will require new hearing aids.    Eyes:        Vision has improved to 20/30 with left cataract.    Respiratory: Negative.    Cardiovascular: Negative.    Gastrointestinal: Negative.  Negative for constipation, diarrhea, nausea and vomiting.   Genitourinary: Negative.    Musculoskeletal: Negative for back pain and neck pain (Neck pain has resolved with PT. ).        Bilateral foot pain has improved with orthotics.    Skin: Negative.         Stable mole on right second toe.    Allergic/Immunologic:        Allergic to dogs.    Neurological: Negative.  Negative for numbness.   Psychiatric/Behavioral: Negative.    All other systems reviewed and are negative.      /73 (BP Location: Left arm, Patient Position: Fowlers, Cuff Size: Adult Large)  Pulse 70  Temp 98.8  F (37.1  C) (Oral)  Resp 20  Ht 1.702 m (5' 7.01\")  Wt 66.1 kg (145 lb 11.6 oz)  HC 59.4 cm  SpO2 100%  BMI 22.82 kg/m2  Physical Exam   Constitutional: He is oriented to person, place, and time and well-developed, well-nourished, and in no distress.   HENT:   Head: Normocephalic and atraumatic.   Right Ear: External ear normal.   Left Ear: External ear normal.   Minor scarring of left tympanic membrane. Dull right tympanic membrane.    Musculoskeletal:   Left sternocleidomastoid tightness    Neurological: He is alert and oriented to person, place, and time. GCS score is 15.   Skin: Skin is " warm and dry.   Stable compared to previous visit.    Psychiatric: Mood and affect normal.       Results for orders placed or performed during the hospital encounter of 06/28/17   MR Brachial Plexus w/o & w Contrast    Narrative    MR CERVICAL SPINE W/O & W CONTRAST, MR BRACHIAL PLEXUS W/O  & W CONTRAST 6/28/2017 7:39 AM    Provided History: evaluate c-spine for new &/or progressive  neurofibromas, Neurofibromatosis, type 1    Comparison: No similar prior studies    Technique:   Cervical: Sagittal T1-weighted, sagittal T2-weighted, sagittal  diffusion weighted, axial T2-weighted, and axial T2* gradient echo  images of the cervical spine were obtained without intravenous  contrast. Following intravenous administration of gadolinium, axial  and sagittal T1-weighted images with fat saturation were also  obtained.  Brachial plexus:Axial fat saturated T2-weighted, sagittal T1-weighted,  oblique coronal T1- and fat saturated T2-weighted images of the  brachial plexus bilaterally obtained without intravenous contrast.  After intravenous gadolinium administration, fat saturated sagital,  coronal and axial T1-weighted images were obtained.    Contrast: Outside cervical MR 2/23/2007    Findings:  Cervical: The cervical vertebrae appear normally aligned.   Straightening of normal cervical lordosis. There is no disc height  narrowing at any level.  There is normal signal within and normal  contour of the cervical spinal cord.  There is no abnormal contrast  enhancement within the cervical spinal cord, thecal sac or vertebral  column.      Tiny left central inferiorly migrating extrusion with effacement of  the ventral subarachnoid space. No spinal canal or neural foraminal  stenosis.    No abnormality of the paraspinous soft tissues.    Brachial plexus: There are several T2 hyperintense, enhancing lesions  in bilateral anterior chest and level 4 reaching up to 1.5 cm in the  right chest. The largest lesion in the right appears  to abut the  brachial plexus. These could represent lymph node versus  neurofibromas.    The roots appear normal along their course. No definite mass or lesion  is noted in the adjacent lung apices.    Post-intravenous contrast images also demonstrate no abnormality of  the brachial plexus.      Impression    Impression:   1. Cervical spine: No abnormal enhancement in the spinal cord, thecal  sac or cervical vertebrae. No definite neurofibroma. No spinal canal  or neural foraminal stenosis.  2. Brachial plexus: No definite mass in the brachial plexus on either  side. Enhancing lesions in bilateral anterior chest and bilateral  level 4, could represent axillary lymph nodes versus neurofibromas.  The largest lesion on the right appears to abut the brachial plexus.    I have personally reviewed the examination and initial interpretation  and I agree with the findings.    ARMIDA SCHULTZ MD     Impression:  1. NF1  2. Bilateral ankle valgus, improved with orthotics.   3. Left cataract - not NF1 related.   4. Neck pain unrelated to NF1, improved with PT.     Plan:  1. RTC in 2 years or sooner as needed.   2. Continue to take methylphenidate medication twice daily without missed doses.   3. Referral to Dermatology.   4. Take Vitamin D3 capsules: 2000 units by mouth daily October through April, 1000 units daily May through Sept.    Time spent with patient 30 minutes.    This document serves as a record of the services and decisions personally performed and made by Baldomero Hernandez MD. It was created on his behalf by William Rodriguez, a trained medical scribe. The creation of this document is based on the provider's statements to the medical scribe.    The documentation recorded by the scribe accurately reflects the services I personally performed and the decisions made by me.    Baldomero Hernandez    It was a pleasure to see you in our Neurofibromatosis clinic today.  Here's our recommendations for  follow-up care:    Referrals/Tests:  Dermatology referral - you will get a call regarding this appt.    Other Instructions:    Vitamin D3 capsules: 2000 units by mouth daily October through April, 1000 units daily May through Sept.    Influenza vaccine every year in the fall.    Ophthalmology (eye MD) exam every year.    Annual physical with your Primary Care Provider.    Return to Clinic:  2 years, or sooner if needed.  You will get a reminder letter to schedule this appt.    ------------------------------------------------------------------------------------------------------------------------------    Neurofibromatosis () Clinic  [facility], 9th [place] [address]  Scheduling/Appointments: [phone]  Fax: [phone]    Numbers to call:   Monday - Friday, 8:00 am - 5:00 pm:    Non-urgent or same-day call-back concerns: [facility] Nurse Triage - Voicemail: [phone]    Urgent concerns:  Care Coordinator - Darya House RN - Pager: [phone] (If you don't get a call back within 15-30 minutes, then Darya is off and you should call [phone]).    Scheduling/Appointments: [phone]  Nights and weekends:   Call [phone] and ask the  to page the 'Pediatric Heme/Onc fellow on call' if you have an urgent concern that can't wait until the clinic opens.  Genetic Counselor:  Lori Iraheta, [facility]: [phone]  Blanca Combs, [facility]: [phone]    Darya House RN, MS  NF Care Coordinator  Pager: [phone]  E-mail: [email or web address]        CC  Patient Care Team:  Sonny Lr as PCP - General (Internal Medicine)  Susan House, RN as Continuity Care Coordinator (Pediatric Hematology/Oncology)  Daphne Egan APRN CNP as Nurse Practitioner (Pediatric Hematology/Oncology)  Radha Hammond, PhD LP (Psychology)    Copy to patient  DEBBIE PATINO  [address]  [place] [address]

## 2025-05-10 NOTE — HOSPITAL COURSE
Ms. Uribe was hospitalized secondary to pyelonephritis.  Reports upset stomach and soreness, though nausea resolved.  No fever since noon on 5/10.  Continue IV antibiotics, IV fluids, and as needed antipyretics, antiemetics, and analgesics.Urine culture grew ESBL, will change antibiotics to ertapenem.  Susceptibility shows sensitivity to ertapenem and ciprofloxacin, no end organ damage, no severe sepsis.  Abdominal pain persistent and repeat CT scan was obtained with findings of left-sided pyelonephritis with concern for possible renal abscess as well as mild periportal edema which was attributed to pyelonephritis.  These findings were discussed with Urology and no intervention is required.  She will discharge on ciprofloxacin for 2 weeks with outpatient neurology follow up.  At discharge her abdominal pain has resolved and she is tolerating a diet. Note was made of a bradycardia while admitted in high 40s to low 50s. EKG confirms this is sinus bradycardia, with normal BP and asymptomatic. Is not on rate lowering medications outpatient. Possibly vagal response to pain causing bradycardia.

## 2025-05-10 NOTE — PLAN OF CARE
Problem: Adult Inpatient Plan of Care  Goal: Plan of Care Review  Outcome: Progressing  Goal: Patient-Specific Goal (Individualized)  Outcome: Progressing  Goal: Optimal Comfort and Wellbeing  Outcome: Progressing  Goal: Readiness for Transition of Care  Outcome: Progressing     Problem: Pain Acute  Goal: Optimal Pain Control and Function  Outcome: Progressing     Problem: Fall Injury Risk  Goal: Absence of Fall and Fall-Related Injury  Outcome: Progressing

## 2025-05-10 NOTE — ASSESSMENT & PLAN NOTE
NANCIE without obvious abscess at this time. Discussed with pt. May develop abscess over time that would need IR drainage. Not currently necessary.   Follow urine cultures, treat x 2 weeks  Rest of care per Hospital Medicine.

## 2025-05-10 NOTE — ASSESSMENT & PLAN NOTE
5/10:  Still with fevers and nausea, continue IV antibiotics, IV fluids, and as needed antipyretics, antiemetics, and analgesics.  Cultures pending.    Patient presents with subjective fever/chills, dysuria, urinary frequency/urgency, left CVA tenderness.  UA with 3+ leukocyte esterase, WBC and bacteria.  CT abdomen pelvis concerning for left pyelonephritis with abscess [place] concerned.  Patient given IV Rocephin and IVF in the ED with p.r.n. analgesics.     Plan:  Urine cultures pending  IVF  cc/hour  Continue IV Rocephin 2 g daily  P.r.n. analgesics and antiemetics ordered  Urology consulted:  Renal ultrasound not concerning for kidney abscess as of yet.  Recommendations to continue IV antibiotics x2 weeks and follow up urine cultures.  If abscess identified, then IR consult for drain placement  Discussed case with IR who stated drainage not warranted at this time but will need follow up imaging after antibiotic treatment

## 2025-05-10 NOTE — PROGRESS NOTES
[facility] Medicine  Progress Note    Patient Name: Von Uribe  MRN: [id number]  Patient Class: IP- Inpatient   Admission Date: 5/9/2025  Length of Stay: 1 days  Attending Physician: Moody Drummond, *  Primary Care Provider: [facility]        Subjective     Principal Problem:Pyelonephritis        HPI:  Von Uribe is a 44 y.o. with past medical history of uterine fibroids presents to the hospital with complaints of worsening dysuria, urinary frequency, and left lower back pain with the associated nausea and vomiting for the past 3 days.    Patient reports several episodes of emesis and diarrhea and unable to tolerate p.o..  Patient also endorses subjective fever/chills.  Per chart review, patient tested positive for gonorrhea on 04/22/2025 and was treated with Rocephin.  Patient states she has a history of frequent UTIs.  Patient denies any other alleviating exacerbating factors.  Patient denies headache, chest pain, shortness on breath, abdominal pain, vaginal discharge, hematemesis, hematochezia, hematuria, or any other associated symptoms.    In the ED: Patient afebrile with leukocytosis (WBC 22.92), hemodynamically stable on presentation, H and H 10.2/32, MCV 75, slightly elevated procalcitonin 1.05, beta HCG negative, potassium 3.1, sodium 132, ALT 9. UA with 3+ ketone 2+ blood, 3+ leukocyte esterase, WBC >100.  Urine culture pending.  CT abdomen pelvis shows left perinephric and periureteral inflammation concerning for left renal infection/pyelonephritis with the possibility of a renal abscess.  Patient given acetaminophen 500 mg, IV Rocephin 1 g, IV morphine 2 mg, IV morphine 4 mg, 1 L NS bolus x2 in the ED. Case discussed with ED provider and patient admitted to hospital medicine for further medical management.    Overview/Hospital Course:  Ms. Uribe was hospitalized secondary to pyelonephritis.  Still with fevers and nausea though  she does report some improvement.  Continue IV antibiotics, IV fluids, and as needed antipyretics, antiemetics, and analgesics.    Interval History: still with fever and nausea    Review of Systems   Constitutional:  Positive for fever.   Gastrointestinal:  Positive for nausea.   Genitourinary:  Positive for flank pain.   All other systems reviewed and are negative.    Objective:     Vital Signs (Most Recent):  Temp: (!) 100.7 °F (38.2 °C) (05/10/25 1155)  Pulse: (!) 46 (05/10/25 1452)  Resp: 16 (05/10/25 1142)  BP: 118/73 (05/10/25 1142)  SpO2: 98 % (05/10/25 1142) Vital Signs (24h Range):  Temp:  [97.9 °F (36.6 °C)-100.7 °F (38.2 °C)] 100.7 °F (38.2 °C)  Pulse:  [46-75] 46  Resp:  [16-18] 16  SpO2:  [96 %-100 %] 98 %  BP: (115-123)/(59-81) 118/73     Weight: 67 kg (147 lb 11.2 oz)  Body mass index is 27.01 kg/m².    Intake/Output Summary (Last 24 hours) at 5/10/2025 1521  Last data filed at 5/10/2025 1135  Gross per 24 hour   Intake 1909.72 ml   Output 700 ml   Net 1209.72 ml         Physical Exam  Vitals and nursing note reviewed.   Constitutional:       General: She is not in acute distress.     Appearance: She is well-developed.   HENT:      Head: Normocephalic and atraumatic.      Right Ear: External ear normal.      Left Ear: External ear normal.   Cardiovascular:      Rate and Rhythm: Regular rhythm.   Pulmonary:      Effort: Pulmonary effort is normal. No respiratory distress.   Skin:     General: Skin is warm and dry.   Neurological:      Mental Status: She is alert and oriented to person, place, and time.   Psychiatric:         Thought Content: Thought content normal.               Significant Labs: All pertinent labs within the past 24 hours have been reviewed.    Significant Imaging: I have reviewed all pertinent imaging results/findings within the past 24 hours.      Assessment & Plan  Pyelonephritis  5/10:  Still with fevers and nausea, continue IV antibiotics, IV fluids, and as needed antipyretics,  antiemetics, and analgesics.  Cultures pending.    Patient presents with subjective fever/chills, dysuria, urinary frequency/urgency, left CVA tenderness.  UA with 3+ leukocyte esterase, WBC and bacteria.  CT abdomen pelvis concerning for left pyelonephritis with abscess [place] concerned.  Patient given IV Rocephin and IVF in the ED with p.r.n. analgesics.     Plan:  Urine cultures pending  IVF  cc/hour  Continue IV Rocephin 2 g daily  P.r.n. analgesics and antiemetics ordered  Urology consulted:  Renal ultrasound not concerning for kidney abscess as of yet.  Recommendations to continue IV antibiotics x2 weeks and follow up urine cultures.  If abscess identified, then IR consult for drain placement  Discussed case with IR who stated drainage not warranted at this time but will need follow up imaging after antibiotic treatment  Overweight (BMI 25.0-29.9)  Body mass index is 27.01 kg/m². Overweight complicates all aspects of disease management from diagnostic modalities to treatment. Weight loss encouraged and health benefits explained to patient.     Leukocytosis  -See plan above for pyelonephritis   Hypokalemia  Patient's most recent potassium results are listed below.   Recent Labs     05/09/25  1022 05/10/25  0634   K 3.1* 2.8*     Plan  - Replete potassium per protocol  - Monitor potassium Daily  - Patient's hypokalemia is worsening. Will adjust treatment as follows: replace as needed  VTE Risk Mitigation (From admission, onward)           Ordered     IP VTE LOW RISK PATIENT  Once         05/09/25 1347     Place sequential compression device  Until discontinued         05/09/25 1347                    Discharge Planning   ARTHUR:      Code Status: Full Code   Medical Readiness for Discharge Date:   Discharge Plan A: Home with family      Ephraim Hill Jr., APRN, AGACNP-BC  Hospitalist - Department of Hospital Medicine  [facility] [address] [place] [address]  Office #:  [phone]; Pager #: [phone]

## 2025-05-10 NOTE — SUBJECTIVE & OBJECTIVE
Interval History: Still with fevers and nausea though she feels some improvement. Mild pain in L flank. Voiding with urgency/frequency.     Review of Systems   Constitutional:  Positive for chills, fatigue and fever.   HENT:  Negative for congestion.    Respiratory:  Negative for chest tightness and shortness of breath.    Cardiovascular:  Negative for chest pain.   Gastrointestinal:  Positive for nausea and vomiting. Negative for abdominal distention, constipation and diarrhea.   Genitourinary:  Positive for dysuria, flank pain, frequency and urgency. Negative for difficulty urinating, hematuria and pelvic pain.   Musculoskeletal:  Negative for arthralgias.   Skin:  Negative for rash.   Neurological:  Negative for dizziness.   Psychiatric/Behavioral:  Negative for confusion.      Objective:     Temp:  [97.9 °F (36.6 °C)-100.7 °F (38.2 °C)] 100.7 °F (38.2 °C)  Pulse:  [46-75] 51  Resp:  [16-18] 16  SpO2:  [96 %-100 %] 98 %  BP: (115-123)/(59-81) 118/73     Body mass index is 27.01 kg/m².    Date 05/10/25 0700 - 05/11/25 0659   Shift 5836-6726 9551-9437 9838-2266 24 Hour Total   INTAKE   P.O. 240   240   Shift Total(mL/kg) 240(3.6)   240(3.6)   OUTPUT   Shift Total(mL/kg)       Weight (kg) 67 67 67 67          Drains       None                    Physical Exam  Vitals and nursing note reviewed.   Constitutional:       General: She is not in acute distress.     Appearance: She is well-developed. She is not ill-appearing.   HENT:      Head: Normocephalic.   Eyes:      Conjunctiva/sclera: Conjunctivae normal.   Neck:      Thyroid: No thyromegaly.      Trachea: No tracheal deviation.   Pulmonary:      Effort: Pulmonary effort is normal. No respiratory distress.   Abdominal:      General: There is no distension.      Palpations: Abdomen is soft. There is no mass.      Tenderness: There is no abdominal tenderness. There is left CVA tenderness. There is no guarding or rebound.      Hernia: No hernia is present.  "  Musculoskeletal:         General: No tenderness. Normal range of motion.      Cervical back: Normal range of motion.   Skin:     General: Skin is warm and dry.      Findings: No erythema or rash.   Neurological:      Mental Status: She is alert and oriented to person, place, and time.   Psychiatric:         Behavior: Behavior normal.         Thought Content: Thought content normal.         Judgment: Judgment normal.           Significant Labs:    BMP:  Recent Labs   Lab 05/09/25  1022 05/10/25  0634   * 136   K 3.1* 2.8*    109   CO2 20* 20*   BUN 12 5*   CREATININE 0.8 0.6   CALCIUM 8.9 7.6*       CBC:   Recent Labs   Lab 05/09/25  1022 05/10/25  0633   WBC 22.92* 15.90*   HGB 10.2* 8.1*   HCT 32.0* 25.7*    162       Blood Culture: No results for input(s): "LABBLOO" in the last 168 hours.  Urine Culture:   Recent Labs   Lab 05/09/25  0952   LABURIN >100,000 cfu/ml Gram-negative Bacilli*     Urine Studies:   Recent Labs   Lab 05/09/25  0952   COLORU Yellow   APPEARANCEUA Cloudy*   PHUR 7.0   SPECGRAV 1.025   PROTEINUA 2+*   GLUCUA Negative   BILIRUBINUA Negative   OCCULTUA 2+*   NITRITE Negative   UROBILINOGEN 2.0-3.0*   LEUKOCYTESUR 3+*   RBCUA 91*   WBCUA >100*   BACTERIA Rare   HYALINECASTS 0       Significant Imaging:  All pertinent imaging results/findings from the past 24 hours have been reviewed.      "

## 2025-05-10 NOTE — ASSESSMENT & PLAN NOTE
Body mass index is 27.01 kg/m². Overweight complicates all aspects of disease management from diagnostic modalities to treatment. Weight loss encouraged and health benefits explained to patient.

## 2025-05-10 NOTE — PROGRESS NOTES
[facility]  Urology  Progress Note    Patient Name: Von Uribe  MRN: [id number]  Admission Date: 5/9/2025  Hospital Length of Stay: 1 days  Code Status: Full Code   Attending Provider: Moody Drummond, *   Primary Care Physician: Todd Torres [facility]-No    Subjective:     HPI:  Pyelonephritis/Abscess  Von Uribe is a 44 y.o. woman who started with urinary frequency, urgency, and dysuria about 5 days ago.  The symptoms progressed to left-sided flank pain fever chills over the last couple of days.  She denies any recent  intervention or history of kidney stones.  She did have a recent gyn infection.    Interval History: Still with fevers and nausea though she feels some improvement. Mild pain in L flank. Voiding with urgency/frequency.     Review of Systems   Constitutional:  Positive for chills, fatigue and fever.   HENT:  Negative for congestion.    Respiratory:  Negative for chest tightness and shortness of breath.    Cardiovascular:  Negative for chest pain.   Gastrointestinal:  Positive for nausea and vomiting. Negative for abdominal distention, constipation and diarrhea.   Genitourinary:  Positive for dysuria, flank pain, frequency and urgency. Negative for difficulty urinating, hematuria and pelvic pain.   Musculoskeletal:  Negative for arthralgias.   Skin:  Negative for rash.   Neurological:  Negative for dizziness.   Psychiatric/Behavioral:  Negative for confusion.      Objective:     Temp:  [97.9 °F (36.6 °C)-100.7 °F (38.2 °C)] 100.7 °F (38.2 °C)  Pulse:  [46-75] 51  Resp:  [16-18] 16  SpO2:  [96 %-100 %] 98 %  BP: (115-123)/(59-81) 118/73     Body mass index is 27.01 kg/m².    Date 05/10/25 0700 - 05/11/25 0659   Shift 9460-6929 5970-0972 6555-1842 24 Hour Total   INTAKE   P.O. 240   240   Shift Total(mL/kg) 240(3.6)   240(3.6)   OUTPUT   Shift Total(mL/kg)       Weight (kg) 67 67 67 67          Drains       None                    Physical  "Exam  Vitals and nursing note reviewed.   Constitutional:       General: She is not in acute distress.     Appearance: She is well-developed. She is not ill-appearing.   HENT:      Head: Normocephalic.   Eyes:      Conjunctiva/sclera: Conjunctivae normal.   Neck:      Thyroid: No thyromegaly.      Trachea: No tracheal deviation.   Pulmonary:      Effort: Pulmonary effort is normal. No respiratory distress.   Abdominal:      General: There is no distension.      Palpations: Abdomen is soft. There is no mass.      Tenderness: There is no abdominal tenderness. There is left CVA tenderness. There is no guarding or rebound.      Hernia: No hernia is present.   Musculoskeletal:         General: No tenderness. Normal range of motion.      Cervical back: Normal range of motion.   Skin:     General: Skin is warm and dry.      Findings: No erythema or rash.   Neurological:      Mental Status: She is alert and oriented to person, place, and time.   Psychiatric:         Behavior: Behavior normal.         Thought Content: Thought content normal.         Judgment: Judgment normal.           Significant Labs:    BMP:  Recent Labs   Lab 05/09/25  1022 05/10/25  0634   * 136   K 3.1* 2.8*    109   CO2 20* 20*   BUN 12 5*   CREATININE 0.8 0.6   CALCIUM 8.9 7.6*       CBC:   Recent Labs   Lab 05/09/25  1022 05/10/25  0633   WBC 22.92* 15.90*   HGB 10.2* 8.1*   HCT 32.0* 25.7*    162       Blood Culture: No results for input(s): "LABBLOO" in the last 168 hours.  Urine Culture:   Recent Labs   Lab 05/09/25  0952   LABURIN >100,000 cfu/ml Gram-negative Bacilli*     Urine Studies:   Recent Labs   Lab 05/09/25  0952   COLORU Yellow   APPEARANCEUA Cloudy*   PHUR 7.0   SPECGRAV 1.025   PROTEINUA 2+*   GLUCUA Negative   BILIRUBINUA Negative   OCCULTUA 2+*   NITRITE Negative   UROBILINOGEN 2.0-3.0*   LEUKOCYTESUR 3+*   RBCUA 91*   WBCUA >100*   BACTERIA Rare   HYALINECASTS 0       Significant Imaging:  All pertinent imaging " results/findings from the past 24 hours have been reviewed.        Assessment/Plan:     * Pyelonephritis  NANCIE without obvious abscess at this time. Discussed with pt. May develop abscess over time that would need IR drainage. Not currently necessary.   Follow urine cultures, treat x 2 weeks  Rest of care per Hospital Medicine.    Leukocytosis   - Some improvement today   - Continue abx, f/u cultures        VTE Risk Mitigation (From admission, onward)           Ordered     IP VTE LOW RISK PATIENT  Once         05/09/25 1347     Place sequential compression device  Until discontinued         05/09/25 1347                    Pat Garnett MD  Urology  [facility] [place] [facility]

## 2025-05-10 NOTE — SUBJECTIVE & OBJECTIVE
Interval History: still with fever and nausea    Review of Systems   Constitutional:  Positive for fever.   Gastrointestinal:  Positive for nausea.   Genitourinary:  Positive for flank pain.   All other systems reviewed and are negative.    Objective:     Vital Signs (Most Recent):  Temp: (!) 100.7 °F (38.2 °C) (05/10/25 1155)  Pulse: (!) 46 (05/10/25 1452)  Resp: 16 (05/10/25 1142)  BP: 118/73 (05/10/25 1142)  SpO2: 98 % (05/10/25 1142) Vital Signs (24h Range):  Temp:  [97.9 °F (36.6 °C)-100.7 °F (38.2 °C)] 100.7 °F (38.2 °C)  Pulse:  [46-75] 46  Resp:  [16-18] 16  SpO2:  [96 %-100 %] 98 %  BP: (115-123)/(59-81) 118/73     Weight: 67 kg (147 lb 11.2 oz)  Body mass index is 27.01 kg/m².    Intake/Output Summary (Last 24 hours) at 5/10/2025 1521  Last data filed at 5/10/2025 1135  Gross per 24 hour   Intake 1909.72 ml   Output 700 ml   Net 1209.72 ml         Physical Exam  Vitals and nursing note reviewed.   Constitutional:       General: She is not in acute distress.     Appearance: She is well-developed.   HENT:      Head: Normocephalic and atraumatic.      Right Ear: External ear normal.      Left Ear: External ear normal.   Cardiovascular:      Rate and Rhythm: Regular rhythm.   Pulmonary:      Effort: Pulmonary effort is normal. No respiratory distress.   Skin:     General: Skin is warm and dry.   Neurological:      Mental Status: She is alert and oriented to person, place, and time.   Psychiatric:         Thought Content: Thought content normal.               Significant Labs: All pertinent labs within the past 24 hours have been reviewed.    Significant Imaging: I have reviewed all pertinent imaging results/findings within the past 24 hours.

## 2025-05-10 NOTE — NURSING
[facility], [facility]  Nurses Note -- 4 Eyes      Report received and care assumed. Discussed plan of care and safety with patient . Reviewed call system. No acute distress noted  5/9/2025         Skin assessed on: Q Shift        [x] No Pressure Injuries Present               []Prevention Measures Documented     [x] Yes LDA  for Pressure Injury Previously documented      [] Yes New Pressure Injury Discovered              [] LDA for New Pressure Injury Added        Attending RN:  Jessica Landeros RN     Second RN:  Suyapa Tellez RN

## 2025-05-10 NOTE — NURSING
[facility], [facility] [place]  Nurses Note -- 4 Eyes      5-9-25      Skin assessed on: Q Shift      [x] No Pressure Injuries Present    []Prevention Measures Documented    [] Yes LDA  for Pressure Injury Previously documented     [] Yes New Pressure Injury Discovered   [] LDA for New Pressure Injury Added      Attending RN:  Devorah Newman RN     Second RN:  Suyapa Tellez RN

## 2025-05-11 LAB
ABSOLUTE EOSINOPHIL (OHS): 0.02 K/UL
ABSOLUTE MONOCYTE (OHS): 1.41 K/UL (ref 0.3–1)
ABSOLUTE NEUTROPHIL COUNT (OHS): 9.46 K/UL (ref 1.8–7.7)
ALBUMIN SERPL BCP-MCNC: 2.4 G/DL (ref 3.5–5.2)
ALP SERPL-CCNC: 112 UNIT/L (ref 40–150)
ALT SERPL W/O P-5'-P-CCNC: 27 UNIT/L (ref 10–44)
ANION GAP (OHS): 12 MMOL/L (ref 8–16)
AST SERPL-CCNC: 34 UNIT/L (ref 11–45)
BACTERIA UR CULT: ABNORMAL
BASOPHILS # BLD AUTO: 0.03 K/UL
BASOPHILS NFR BLD AUTO: 0.2 %
BILIRUB SERPL-MCNC: 0.4 MG/DL (ref 0.1–1)
BUN SERPL-MCNC: 5 MG/DL (ref 6–20)
CALCIUM SERPL-MCNC: 7.9 MG/DL (ref 8.7–10.5)
CHLORIDE SERPL-SCNC: 106 MMOL/L (ref 95–110)
CO2 SERPL-SCNC: 20 MMOL/L (ref 23–29)
CREAT SERPL-MCNC: 0.6 MG/DL (ref 0.5–1.4)
ERYTHROCYTE [DISTWIDTH] IN BLOOD BY AUTOMATED COUNT: 21.9 % (ref 11.5–14.5)
GFR SERPLBLD CREATININE-BSD FMLA CKD-EPI: >60 ML/MIN/1.73/M2
GLUCOSE SERPL-MCNC: 76 MG/DL (ref 70–110)
HCT VFR BLD AUTO: 25.7 % (ref 37–48.5)
HGB BLD-MCNC: 8 GM/DL (ref 12–16)
IMM GRANULOCYTES # BLD AUTO: 0.09 K/UL (ref 0–0.04)
IMM GRANULOCYTES NFR BLD AUTO: 0.7 % (ref 0–0.5)
LACTATE SERPL-SCNC: 0.6 MMOL/L (ref 0.5–2.2)
LYMPHOCYTES # BLD AUTO: 1.06 K/UL (ref 1–4.8)
MAGNESIUM SERPL-MCNC: 1.8 MG/DL (ref 1.6–2.6)
MCH RBC QN AUTO: 23.7 PG (ref 27–31)
MCHC RBC AUTO-ENTMCNC: 31.1 G/DL (ref 32–36)
MCV RBC AUTO: 76 FL (ref 82–98)
NUCLEATED RBC (/100WBC) (OHS): 0 /100 WBC
PHOSPHATE SERPL-MCNC: 1.5 MG/DL (ref 2.7–4.5)
PLATELET # BLD AUTO: 166 K/UL (ref 150–450)
PMV BLD AUTO: 11.1 FL (ref 9.2–12.9)
POTASSIUM SERPL-SCNC: 3.1 MMOL/L (ref 3.5–5.1)
PROT SERPL-MCNC: 6 GM/DL (ref 6–8.4)
RBC # BLD AUTO: 3.37 M/UL (ref 4–5.4)
RELATIVE EOSINOPHIL (OHS): 0.2 %
RELATIVE LYMPHOCYTE (OHS): 8.8 % (ref 18–48)
RELATIVE MONOCYTE (OHS): 11.7 % (ref 4–15)
RELATIVE NEUTROPHIL (OHS): 78.4 % (ref 38–73)
SODIUM SERPL-SCNC: 138 MMOL/L (ref 136–145)
WBC # BLD AUTO: 12.07 K/UL (ref 3.9–12.7)

## 2025-05-11 PROCEDURE — 25000003 PHARM REV CODE 250: Performed by: HOSPITALIST

## 2025-05-11 PROCEDURE — 63600175 PHARM REV CODE 636 W HCPCS: Performed by: HOSPITALIST

## 2025-05-11 PROCEDURE — 63600175 PHARM REV CODE 636 W HCPCS

## 2025-05-11 PROCEDURE — 83735 ASSAY OF MAGNESIUM: CPT

## 2025-05-11 PROCEDURE — 83605 ASSAY OF LACTIC ACID: CPT | Performed by: HOSPITALIST

## 2025-05-11 PROCEDURE — 82040 ASSAY OF SERUM ALBUMIN: CPT

## 2025-05-11 PROCEDURE — 36415 COLL VENOUS BLD VENIPUNCTURE: CPT

## 2025-05-11 PROCEDURE — 84100 ASSAY OF PHOSPHORUS: CPT

## 2025-05-11 PROCEDURE — 85025 COMPLETE CBC W/AUTO DIFF WBC: CPT

## 2025-05-11 PROCEDURE — 99232 SBSQ HOSP IP/OBS MODERATE 35: CPT | Mod: ,,, | Performed by: UROLOGY

## 2025-05-11 PROCEDURE — 21400001 HC TELEMETRY ROOM

## 2025-05-11 PROCEDURE — 27000207 HC ISOLATION

## 2025-05-11 RX ORDER — L. ACIDOPHILUS/L.BULGARICUS 100MM CELL
1 GRANULES IN PACKET (EA) ORAL 2 TIMES DAILY
Status: DISCONTINUED | OUTPATIENT
Start: 2025-05-11 | End: 2025-05-12

## 2025-05-11 RX ORDER — ALUMINUM HYDROXIDE, MAGNESIUM HYDROXIDE, AND SIMETHICONE 1200; 120; 1200 MG/30ML; MG/30ML; MG/30ML
30 SUSPENSION ORAL EVERY 4 HOURS PRN
Status: DISCONTINUED | OUTPATIENT
Start: 2025-05-11 | End: 2025-05-13 | Stop reason: HOSPADM

## 2025-05-11 RX ADMIN — ACETAMINOPHEN 650 MG: 325 TABLET ORAL at 06:05

## 2025-05-11 RX ADMIN — ERTAPENEM 1 G: 1 INJECTION INTRAMUSCULAR; INTRAVENOUS at 02:05

## 2025-05-11 RX ADMIN — LACTOBACILLUS ACIDOPHILUS / LACTOBACILLUS BULGARICUS 1 EACH: 100 MILLION CFU STRENGTH GRANULES at 10:05

## 2025-05-11 RX ADMIN — SODIUM CHLORIDE, POTASSIUM CHLORIDE, SODIUM LACTATE AND CALCIUM CHLORIDE: 600; 310; 30; 20 INJECTION, SOLUTION INTRAVENOUS at 02:05

## 2025-05-11 RX ADMIN — MORPHINE SULFATE 4 MG: 4 INJECTION INTRAVENOUS at 12:05

## 2025-05-11 RX ADMIN — SODIUM CHLORIDE, POTASSIUM CHLORIDE, SODIUM LACTATE AND CALCIUM CHLORIDE: 600; 310; 30; 20 INJECTION, SOLUTION INTRAVENOUS at 10:05

## 2025-05-11 RX ADMIN — PSYLLIUM HUSK 1 PACKET: 3.4 POWDER ORAL at 10:05

## 2025-05-11 RX ADMIN — CEFTRIAXONE SODIUM 2 G: 2 INJECTION, POWDER, FOR SOLUTION INTRAMUSCULAR; INTRAVENOUS at 10:05

## 2025-05-11 NOTE — PROGRESS NOTES
[facility] Medicine  Progress Note    Patient Name: Von Uribe  MRN: [id number]  Patient Class: IP- Inpatient   Admission Date: 5/9/2025  Length of Stay: 2 days  Attending Physician: Moody Drummond, *  Primary Care Provider: [facility]        Subjective     Principal Problem:Pyelonephritis        HPI:  Von Uribe is a 44 y.o. with past medical history of uterine fibroids presents to the hospital with complaints of worsening dysuria, urinary frequency, and left lower back pain with the associated nausea and vomiting for the past 3 days.    Patient reports several episodes of emesis and diarrhea and unable to tolerate p.o..  Patient also endorses subjective fever/chills.  Per chart review, patient tested positive for gonorrhea on 04/22/2025 and was treated with Rocephin.  Patient states she has a history of frequent UTIs.  Patient denies any other alleviating exacerbating factors.  Patient denies headache, chest pain, shortness on breath, abdominal pain, vaginal discharge, hematemesis, hematochezia, hematuria, or any other associated symptoms.    In the ED: Patient afebrile with leukocytosis (WBC 22.92), hemodynamically stable on presentation, H and H 10.2/32, MCV 75, slightly elevated procalcitonin 1.05, beta HCG negative, potassium 3.1, sodium 132, ALT 9. UA with 3+ ketone 2+ blood, 3+ leukocyte esterase, WBC >100.  Urine culture pending.  CT abdomen pelvis shows left perinephric and periureteral inflammation concerning for left renal infection/pyelonephritis with the possibility of a renal abscess.  Patient given acetaminophen 500 mg, IV Rocephin 1 g, IV morphine 2 mg, IV morphine 4 mg, 1 L NS bolus x2 in the ED. Case discussed with ED provider and patient admitted to hospital medicine for further medical management.    Overview/Hospital Course:  Ms. Uribe was hospitalized secondary to pyelonephritis.  Reports upset stomach and soreness,  though nausea resolved.  No fever since noon on 5/10.  Continue IV antibiotics, IV fluids, and as needed antipyretics, antiemetics, and analgesics.    Addendum: Urine culture grew ESBL, will change antibiotics to ertapenem.  Susceptibility shows sensitivity to ertapenem, no end organ damage, no severe sepsis, and she desires as early discharge as possible and will need IV antibiotics at home.  Daily dosing of ertapenem would be better for home administration.      Interval History: upset stomach, decreased appetite    Review of Systems   Constitutional:  Positive for appetite change.   Gastrointestinal:  Positive for abdominal pain.   All other systems reviewed and are negative.    Objective:     Vital Signs (Most Recent):  Temp: 99 °F (37.2 °C) (05/11/25 0452)  Pulse: (!) 45 (05/11/25 0747)  Resp: 18 (05/11/25 0452)  BP: (!) 144/65 (05/11/25 0452)  SpO2: 98 % (05/11/25 0452) Vital Signs (24h Range):  Temp:  [98 °F (36.7 °C)-100.7 °F (38.2 °C)] 99 °F (37.2 °C)  Pulse:  [43-68] 45  Resp:  [16-18] 18  SpO2:  [95 %-100 %] 98 %  BP: (111-146)/(65-73) 144/65     Weight: 67 kg (147 lb 11.2 oz)  Body mass index is 27.01 kg/m².    Intake/Output Summary (Last 24 hours) at 5/11/2025 0945  Last data filed at 5/11/2025 0800  Gross per 24 hour   Intake 1874.75 ml   Output --   Net 1874.75 ml         Physical Exam  Vitals and nursing note reviewed.   Constitutional:       General: She is not in acute distress.     Appearance: She is well-developed.   HENT:      Head: Normocephalic and atraumatic.      Right Ear: External ear normal.      Left Ear: External ear normal.   Cardiovascular:      Rate and Rhythm: Normal rate and regular rhythm.   Pulmonary:      Effort: Pulmonary effort is normal. No respiratory distress.   Skin:     General: Skin is warm and dry.   Neurological:      Mental Status: She is alert and oriented to person, place, and time.   Psychiatric:         Thought Content: Thought content normal.                Significant Labs: All pertinent labs within the past 24 hours have been reviewed.    Significant Imaging: I have reviewed all pertinent imaging results/findings within the past 24 hours.      Assessment & Plan  Pyelonephritis  5/11: last fever noon yesterday, urine culture with gram negative bacilli, continue IV antibitoics    5/10:  Still with fevers and nausea, continue IV antibiotics, IV fluids, and as needed antipyretics, antiemetics, and analgesics.  Cultures pending.  Patient presents with subjective fever/chills, dysuria, urinary frequency/urgency, left CVA tenderness.  UA with 3+ leukocyte esterase, WBC and bacteria.  CT abdomen pelvis concerning for left pyelonephritis with abscess [place] concerned.  Patient given IV Rocephin and IVF in the ED with p.r.n. analgesics.     Plan:  Urine cultures pending  IVF  cc/hour  Continue IV Rocephin 2 g daily  P.r.n. analgesics and antiemetics ordered  Urology consulted:  Renal ultrasound not concerning for kidney abscess as of yet.  Recommendations to continue IV antibiotics x2 weeks and follow up urine cultures.  If abscess identified, then IR consult for drain placement  Discussed case with IR who stated drainage not warranted at this time but will need follow up imaging after antibiotic treatment  Overweight (BMI 25.0-29.9)  Body mass index is 27.01 kg/m². Overweight complicates all aspects of disease management from diagnostic modalities to treatment. Weight loss encouraged and health benefits explained to patient.     Leukocytosis  -See plan above for pyelonephritis   Hypokalemia  Patient's most recent potassium results are listed below.   Recent Labs     05/09/25  1022 05/10/25  0634 05/11/25  0410   K 3.1* 2.8* 3.1*     Plan  - Replete potassium per protocol  - Monitor potassium Daily  - Patient's hypokalemia is stable but not resolved  VTE Risk Mitigation (From admission, onward)           Ordered     IP VTE LOW RISK PATIENT  Once         05/09/25  1347     Place sequential compression device  Until discontinued         05/09/25 1347                    Discharge Planning   ARTHUR:      Code Status: Full Code   Medical Readiness for Discharge Date:   Discharge Plan A: Home with family      Ephraim Hill Jr., APRN, [facility]-BC  Hospitalist - Department of Hospital Medicine  [facility] [place] [address] [place] [address]  Office #: [phone]; Pager #: [phone]

## 2025-05-11 NOTE — SUBJECTIVE & OBJECTIVE
Interval History: upset stomach, decreased appetite    Review of Systems   Constitutional:  Positive for appetite change.   Gastrointestinal:  Positive for abdominal pain.   All other systems reviewed and are negative.    Objective:     Vital Signs (Most Recent):  Temp: 99 °F (37.2 °C) (05/11/25 0452)  Pulse: (!) 45 (05/11/25 0747)  Resp: 18 (05/11/25 0452)  BP: (!) 144/65 (05/11/25 0452)  SpO2: 98 % (05/11/25 0452) Vital Signs (24h Range):  Temp:  [98 °F (36.7 °C)-100.7 °F (38.2 °C)] 99 °F (37.2 °C)  Pulse:  [43-68] 45  Resp:  [16-18] 18  SpO2:  [95 %-100 %] 98 %  BP: (111-146)/(65-73) 144/65     Weight: 67 kg (147 lb 11.2 oz)  Body mass index is 27.01 kg/m².    Intake/Output Summary (Last 24 hours) at 5/11/2025 0945  Last data filed at 5/11/2025 0800  Gross per 24 hour   Intake 1874.75 ml   Output --   Net 1874.75 ml         Physical Exam  Vitals and nursing note reviewed.   Constitutional:       General: She is not in acute distress.     Appearance: She is well-developed.   HENT:      Head: Normocephalic and atraumatic.      Right Ear: External ear normal.      Left Ear: External ear normal.   Cardiovascular:      Rate and Rhythm: Normal rate and regular rhythm.   Pulmonary:      Effort: Pulmonary effort is normal. No respiratory distress.   Skin:     General: Skin is warm and dry.   Neurological:      Mental Status: She is alert and oriented to person, place, and time.   Psychiatric:         Thought Content: Thought content normal.               Significant Labs: All pertinent labs within the past 24 hours have been reviewed.    Significant Imaging: I have reviewed all pertinent imaging results/findings within the past 24 hours.

## 2025-05-11 NOTE — ASSESSMENT & PLAN NOTE
5/11: last fever noon yesterday, urine culture with gram negative bacilli, continue IV antibitoics    5/10:  Still with fevers and nausea, continue IV antibiotics, IV fluids, and as needed antipyretics, antiemetics, and analgesics.  Cultures pending.  Patient presents with subjective fever/chills, dysuria, urinary frequency/urgency, left CVA tenderness.  UA with 3+ leukocyte esterase, WBC and bacteria.  CT abdomen pelvis concerning for left pyelonephritis with abscess [place] concerned.  Patient given IV Rocephin and IVF in the ED with p.r.n. analgesics.     Plan:  Urine cultures pending  IVF  cc/hour  Continue IV Rocephin 2 g daily  P.r.n. analgesics and antiemetics ordered  Urology consulted:  Renal ultrasound not concerning for kidney abscess as of yet.  Recommendations to continue IV antibiotics x2 weeks and follow up urine cultures.  If abscess identified, then IR consult for drain placement  Discussed case with IR who stated drainage not warranted at this time but will need follow up imaging after antibiotic treatment

## 2025-05-11 NOTE — PLAN OF CARE
Problem: Infection  Goal: Absence of Infection Signs and Symptoms  Outcome: Progressing  Intervention: Prevent or Manage Infection  Flowsheets (Taken 5/11/2025 1117)  Fever Reduction/Comfort Measures:   lightweight bedding   fluid intake increased  Infection Management:   aseptic technique maintained   cultures obtained and sent to lab  Isolation Precautions: contact

## 2025-05-11 NOTE — ASSESSMENT & PLAN NOTE
Patient's most recent potassium results are listed below.   Recent Labs     05/09/25  1022 05/10/25  0634 05/11/25  0410   K 3.1* 2.8* 3.1*     Plan  - Replete potassium per protocol  - Monitor potassium Daily  - Patient's hypokalemia is stable but not resolved

## 2025-05-11 NOTE — NURSING
[facility], [facility]  Nurses Note -- 4 Eyes      Report received and care assumed. Discussed plan of care and safety with patient . Reviewed call system. No acute distress noted  5/9/2025         Skin assessed on: Q Shift        [x] No Pressure Injuries Present               []Prevention Measures Documented     [] Yes LDA  for Pressure Injury Previously documented      [] Yes New Pressure Injury Discovered              [] LDA for New Pressure Injury Added        Attending RN:  Jessica Landeros RN     Second RN:  Suyapa Tellez RN

## 2025-05-11 NOTE — NURSING
Lab called. Urine culture from 05/09 came back catherine, skyler. We are putting her on contact isolation. Primary was notified.

## 2025-05-11 NOTE — PROGRESS NOTES
[facility] Surg  Urology  Progress Note    Patient Name: Von Uribe  MRN: [id number]  Admission Date: 5/9/2025  Hospital Length of Stay: 2 days  Code Status: Full Code   Attending Provider: Moody Drummond, *   Primary Care Physician: Todd Torres [facility]    Subjective:     HPI:  Pyelonephritis/Abscess  Von Uribe is a 44 y.o. woman who started with urinary frequency, urgency, and dysuria about 5 days ago.  The symptoms progressed to left-sided flank pain fever chills over the last couple of days.  She denies any recent  intervention or history of kidney stones.  She did have a recent gyn infection.    Interval History: Nausea and appetite have improved. No flank pain. +fevers though more mild. Feeling much better.     Review of Systems   Constitutional:  Positive for fever. Negative for appetite change, chills and fatigue.   HENT:  Negative for congestion.    Respiratory:  Negative for chest tightness and shortness of breath.    Cardiovascular:  Negative for chest pain.   Gastrointestinal:  Negative for abdominal distention, constipation, diarrhea, nausea and vomiting.   Genitourinary:  Positive for frequency. Negative for difficulty urinating, dysuria, flank pain, hematuria, pelvic pain and urgency.   Musculoskeletal:  Negative for arthralgias.   Skin:  Negative for rash.   Neurological:  Negative for dizziness.   Psychiatric/Behavioral:  Negative for confusion.      Objective:     Temp:  [98 °F (36.7 °C)-100.7 °F (38.2 °C)] 99 °F (37.2 °C)  Pulse:  [43-60] 46  Resp:  [16-18] 17  SpO2:  [95 %-99 %] 98 %  BP: (111-155)/(65-79) 155/79     Body mass index is 27.01 kg/m².    Date 05/11/25 0700 - 05/12/25 0659   Shift 5420-5336 2208-1664 0560-0226 24 Hour Total   INTAKE   I.V.(mL/kg) 1514.8(22.6)   1514.8(22.6)   Shift Total(mL/kg) 1514.8(22.6)   1514.8(22.6)   OUTPUT   Shift Total(mL/kg)       Weight (kg) 67 67 67 67          Drains       None                   "  Physical Exam  Vitals and nursing note reviewed.   Constitutional:       General: She is not in acute distress.     Appearance: She is well-developed. She is not ill-appearing.   HENT:      Head: Normocephalic.   Eyes:      Conjunctiva/sclera: Conjunctivae normal.   Neck:      Thyroid: No thyromegaly.      Trachea: No tracheal deviation.   Pulmonary:      Effort: Pulmonary effort is normal. No respiratory distress.   Abdominal:      General: There is no distension.      Palpations: Abdomen is soft. There is no mass.      Tenderness: There is no abdominal tenderness. There is no left CVA tenderness, guarding or rebound.      Hernia: No hernia is present.   Musculoskeletal:         General: No tenderness. Normal range of motion.      Cervical back: Normal range of motion.   Skin:     General: Skin is warm and dry.      Findings: No erythema or rash.   Neurological:      Mental Status: She is alert and oriented to person, place, and time.   Psychiatric:         Behavior: Behavior normal.         Thought Content: Thought content normal.         Judgment: Judgment normal.           Significant Labs:    BMP:  Recent Labs   Lab 05/09/25  1022 05/10/25  0634 05/11/25  0410   * 136 138   K 3.1* 2.8* 3.1*    109 106   CO2 20* 20* 20*   BUN 12 5* 5*   CREATININE 0.8 0.6 0.6   CALCIUM 8.9 7.6* 7.9*       CBC:   Recent Labs   Lab 05/09/25  1022 05/10/25  0633 05/11/25  0410   WBC 22.92* 15.90* 12.07   HGB 10.2* 8.1* 8.0*   HCT 32.0* 25.7* 25.7*    162 166       Blood Culture: No results for input(s): "LABBLOO" in the last 168 hours.  Urine Culture:   Recent Labs   Lab 05/09/25  0952   LABURIN >100,000 cfu/ml Gram-negative Bacilli*     Urine Studies:   Recent Labs   Lab 05/09/25  0952   COLORU Yellow   APPEARANCEUA Cloudy*   PHUR 7.0   SPECGRAV 1.025   PROTEINUA 2+*   GLUCUA Negative   BILIRUBINUA Negative   OCCULTUA 2+*   NITRITE Negative   UROBILINOGEN 2.0-3.0*   LEUKOCYTESUR 3+*   RBCUA 91*   WBCUA >100* "   BACTERIA Rare   HYALINECASTS 0       Significant Imaging:  All pertinent imaging results/findings from the past 24 hours have been reviewed.                  Assessment/Plan:     * Pyelonephritis  NANCIE without obvious abscess at this time. Discussed with pt. May develop abscess over time that would need IR drainage. Not currently necessary.   May need repeat imaging - clinically improving, no new imaging indicated at this time  Follow urine cultures - E coli ESBL, treat x 2 weeks  Rest of care per Hospital Medicine.    Leukocytosis   - Improving   - Continue abx, f/u cultures        VTE Risk Mitigation (From admission, onward)           Ordered     IP VTE LOW RISK PATIENT  Once         05/09/25 1347     Place sequential compression device  Until discontinued         05/09/25 1347                    Pat Garnett MD  Urology  [facility] Surg

## 2025-05-12 LAB
ABSOLUTE EOSINOPHIL (OHS): 0.05 K/UL
ABSOLUTE MONOCYTE (OHS): 1.43 K/UL (ref 0.3–1)
ABSOLUTE NEUTROPHIL COUNT (OHS): 7.17 K/UL (ref 1.8–7.7)
ALBUMIN SERPL BCP-MCNC: 2.4 G/DL (ref 3.5–5.2)
ALP SERPL-CCNC: 79 UNIT/L (ref 40–150)
ALT SERPL W/O P-5'-P-CCNC: 39 UNIT/L (ref 10–44)
ANION GAP (OHS): 12 MMOL/L (ref 8–16)
AST SERPL-CCNC: 32 UNIT/L (ref 11–45)
BASOPHILS # BLD AUTO: 0.04 K/UL
BASOPHILS NFR BLD AUTO: 0.4 %
BILIRUB SERPL-MCNC: 0.4 MG/DL (ref 0.1–1)
BUN SERPL-MCNC: 3 MG/DL (ref 6–20)
CALCIUM SERPL-MCNC: 8.1 MG/DL (ref 8.7–10.5)
CHLORIDE SERPL-SCNC: 103 MMOL/L (ref 95–110)
CO2 SERPL-SCNC: 22 MMOL/L (ref 23–29)
CREAT SERPL-MCNC: 0.5 MG/DL (ref 0.5–1.4)
ERYTHROCYTE [DISTWIDTH] IN BLOOD BY AUTOMATED COUNT: 21.5 % (ref 11.5–14.5)
GFR SERPLBLD CREATININE-BSD FMLA CKD-EPI: >60 ML/MIN/1.73/M2
GLUCOSE SERPL-MCNC: 84 MG/DL (ref 70–110)
HCT VFR BLD AUTO: 27.4 % (ref 37–48.5)
HGB BLD-MCNC: 8.6 GM/DL (ref 12–16)
IMM GRANULOCYTES # BLD AUTO: 0.26 K/UL (ref 0–0.04)
IMM GRANULOCYTES NFR BLD AUTO: 2.6 % (ref 0–0.5)
LYMPHOCYTES # BLD AUTO: 1.17 K/UL (ref 1–4.8)
MAGNESIUM SERPL-MCNC: 1.6 MG/DL (ref 1.6–2.6)
MCH RBC QN AUTO: 23.8 PG (ref 27–31)
MCHC RBC AUTO-ENTMCNC: 31.4 G/DL (ref 32–36)
MCV RBC AUTO: 76 FL (ref 82–98)
NUCLEATED RBC (/100WBC) (OHS): 0 /100 WBC
PHOSPHATE SERPL-MCNC: 1.7 MG/DL (ref 2.7–4.5)
PLATELET # BLD AUTO: 192 K/UL (ref 150–450)
PMV BLD AUTO: 11 FL (ref 9.2–12.9)
POTASSIUM SERPL-SCNC: 3 MMOL/L (ref 3.5–5.1)
PROT SERPL-MCNC: 6.2 GM/DL (ref 6–8.4)
RBC # BLD AUTO: 3.62 M/UL (ref 4–5.4)
RELATIVE EOSINOPHIL (OHS): 0.5 %
RELATIVE LYMPHOCYTE (OHS): 11.6 % (ref 18–48)
RELATIVE MONOCYTE (OHS): 14.1 % (ref 4–15)
RELATIVE NEUTROPHIL (OHS): 70.8 % (ref 38–73)
SODIUM SERPL-SCNC: 137 MMOL/L (ref 136–145)
WBC # BLD AUTO: 10.12 K/UL (ref 3.9–12.7)

## 2025-05-12 PROCEDURE — 83735 ASSAY OF MAGNESIUM: CPT

## 2025-05-12 PROCEDURE — 25500020 PHARM REV CODE 255: Performed by: HOSPITALIST

## 2025-05-12 PROCEDURE — 36410 VNPNXR 3YR/> PHY/QHP DX/THER: CPT

## 2025-05-12 PROCEDURE — 80053 COMPREHEN METABOLIC PANEL: CPT

## 2025-05-12 PROCEDURE — 99233 SBSQ HOSP IP/OBS HIGH 50: CPT | Mod: ,,, | Performed by: STUDENT IN AN ORGANIZED HEALTH CARE EDUCATION/TRAINING PROGRAM

## 2025-05-12 PROCEDURE — 36415 COLL VENOUS BLD VENIPUNCTURE: CPT

## 2025-05-12 PROCEDURE — 63600175 PHARM REV CODE 636 W HCPCS: Performed by: HOSPITALIST

## 2025-05-12 PROCEDURE — 21400001 HC TELEMETRY ROOM

## 2025-05-12 PROCEDURE — 25000003 PHARM REV CODE 250: Performed by: HOSPITALIST

## 2025-05-12 PROCEDURE — 85025 COMPLETE CBC W/AUTO DIFF WBC: CPT

## 2025-05-12 PROCEDURE — 27000207 HC ISOLATION

## 2025-05-12 PROCEDURE — 84100 ASSAY OF PHOSPHORUS: CPT

## 2025-05-12 PROCEDURE — C1751 CATH, INF, PER/CENT/MIDLINE: HCPCS

## 2025-05-12 PROCEDURE — 63600175 PHARM REV CODE 636 W HCPCS

## 2025-05-12 RX ORDER — SODIUM CHLORIDE 0.9 % (FLUSH) 0.9 %
10 SYRINGE (ML) INJECTION EVERY 12 HOURS PRN
Status: DISCONTINUED | OUTPATIENT
Start: 2025-05-12 | End: 2025-05-13 | Stop reason: HOSPADM

## 2025-05-12 RX ORDER — MAGNESIUM SULFATE HEPTAHYDRATE 40 MG/ML
2 INJECTION, SOLUTION INTRAVENOUS ONCE
Status: COMPLETED | OUTPATIENT
Start: 2025-05-12 | End: 2025-05-12

## 2025-05-12 RX ORDER — AMOXICILLIN 250 MG
1 CAPSULE ORAL ONCE
Status: COMPLETED | OUTPATIENT
Start: 2025-05-12 | End: 2025-05-12

## 2025-05-12 RX ORDER — POTASSIUM CHLORIDE 20 MEQ/1
40 TABLET, EXTENDED RELEASE ORAL ONCE
Status: COMPLETED | OUTPATIENT
Start: 2025-05-12 | End: 2025-05-12

## 2025-05-12 RX ADMIN — ERTAPENEM 1 G: 1 INJECTION INTRAMUSCULAR; INTRAVENOUS at 03:05

## 2025-05-12 RX ADMIN — PSYLLIUM HUSK 1 PACKET: 3.4 POWDER ORAL at 08:05

## 2025-05-12 RX ADMIN — MAGNESIUM SULFATE HEPTAHYDRATE 2 G: 40 INJECTION, SOLUTION INTRAVENOUS at 10:05

## 2025-05-12 RX ADMIN — POTASSIUM CHLORIDE 40 MEQ: 1500 TABLET, EXTENDED RELEASE ORAL at 10:05

## 2025-05-12 RX ADMIN — SENNOSIDES AND DOCUSATE SODIUM 1 TABLET: 50; 8.6 TABLET ORAL at 10:05

## 2025-05-12 RX ADMIN — SODIUM CHLORIDE, POTASSIUM CHLORIDE, SODIUM LACTATE AND CALCIUM CHLORIDE: 600; 310; 30; 20 INJECTION, SOLUTION INTRAVENOUS at 04:05

## 2025-05-12 RX ADMIN — LACTOBACILLUS ACIDOPHILUS / LACTOBACILLUS BULGARICUS 1 EACH: 100 MILLION CFU STRENGTH GRANULES at 08:05

## 2025-05-12 RX ADMIN — MORPHINE SULFATE 4 MG: 4 INJECTION INTRAVENOUS at 08:05

## 2025-05-12 RX ADMIN — MORPHINE SULFATE 4 MG: 4 INJECTION INTRAVENOUS at 05:05

## 2025-05-12 RX ADMIN — MORPHINE SULFATE 4 MG: 4 INJECTION INTRAVENOUS at 03:05

## 2025-05-12 RX ADMIN — IOHEXOL 100 ML: 350 INJECTION, SOLUTION INTRAVENOUS at 07:05

## 2025-05-12 RX ADMIN — MORPHINE SULFATE 4 MG: 4 INJECTION INTRAVENOUS at 10:05

## 2025-05-12 NOTE — PROGRESS NOTES
[facility] Surg  Urology  Progress Note    Patient Name: Von Uribe  MRN: [id number]  Admission Date: 5/9/2025  Hospital Length of Stay: 3 days  Code Status: Full Code   Attending Provider: Xiao Mcconnell MD   Primary Care Physician: [facility]    Subjective:     HPI:  Pyelonephritis/Abscess  Von Uribe is a 44 y.o. woman who started with urinary frequency, urgency, and dysuria about 5 days ago.  The symptoms progressed to left-sided flank pain fever chills over the last couple of days.  She denies any recent  intervention or history of kidney stones.  She did have a recent gyn infection.    Interval History: denies flank pain, resting comfortably    Review of Systems   Constitutional:  Negative for appetite change, chills, fatigue and fever.   HENT:  Negative for congestion.    Respiratory:  Negative for chest tightness and shortness of breath.    Cardiovascular:  Negative for chest pain.   Gastrointestinal:  Negative for abdominal distention, constipation, diarrhea, nausea and vomiting.   Genitourinary:  Negative for difficulty urinating, dysuria, flank pain, frequency, hematuria, pelvic pain and urgency.   Musculoskeletal:  Negative for arthralgias.   Skin:  Negative for rash.   Neurological:  Negative for dizziness.   Psychiatric/Behavioral:  Negative for confusion.      Objective:     Temp:  [98.2 °F (36.8 °C)-99.1 °F (37.3 °C)] 99.1 °F (37.3 °C)  Pulse:  [41-60] 47  Resp:  [16-18] 16  SpO2:  [97 %-99 %] 97 %  BP: (124-139)/(68-86) 124/74     Body mass index is 27.01 kg/m².           Drains       None                    Physical Exam  Constitutional:       Appearance: She is well-developed.   HENT:      Head: Normocephalic and atraumatic.   Eyes:      Conjunctiva/sclera: Conjunctivae normal.   Pulmonary:      Effort: Pulmonary effort is normal. No respiratory distress.   Abdominal:      General: Abdomen is flat. There is no distension.      Tenderness:  There is no abdominal tenderness. There is no guarding.   Skin:     Findings: No rash.   Neurological:      Mental Status: She is alert and oriented to person, place, and time.   Psychiatric:         Behavior: Behavior normal.           Significant Labs:    BMP:  Recent Labs   Lab 05/10/25  0634 05/11/25  0410 05/12/25  0434    138 137   K 2.8* 3.1* 3.0*    106 103   CO2 20* 20* 22*   BUN 5* 5* 3*   CREATININE 0.6 0.6 0.5   CALCIUM 7.6* 7.9* 8.1*       CBC:   Recent Labs   Lab 05/10/25  0633 05/11/25  0410 05/12/25  0434   WBC 15.90* 12.07 10.12   HGB 8.1* 8.0* 8.6*   HCT 25.7* 25.7* 27.4*    166 192       Urine Culture:   Recent Labs   Lab 05/09/25  0952   LABURIN >100,000 cfu/ml Escherichia coli ESBL*                       Assessment/Plan:     * Pyelonephritis  NANCIE without obvious abscess at this time. Discussed with pt. May develop abscess over time that would need IR drainage. Not currently necessary.   May need repeat imaging - clinically improving, no new imaging indicated at this time  Follow urine cultures - E coli ESBL, treat x 2 weeks.   Okay to consider ciprofloxacin 500mg BID PO instead of IV antibiotics at home based on culture sensitivities  Rest of care per Hospital Medicine.    Leukocytosis   - Improving   - Continue abx, f/u cultures        VTE Risk Mitigation (From admission, onward)           Ordered     IP VTE LOW RISK PATIENT  Once         05/09/25 1347     Place sequential compression device  Until discontinued         05/09/25 1347                    Donovan Yip MD  Urology  [facility] S[facility]

## 2025-05-12 NOTE — ASSESSMENT & PLAN NOTE
5/12: ESBL, abx switched to ertapenem on 5/11, appreciate Urology recs, plan is for IV abx for 2 weeks.    5/11: last fever noon yesterday, urine culture with gram negative bacilli, continue IV antibitoics  5/10:  Still with fevers and nausea, continue IV antibiotics, IV fluids, and as needed antipyretics, antiemetics, and analgesics.  Cultures pending.  Patient presents with subjective fever/chills, dysuria, urinary frequency/urgency, left CVA tenderness.  UA with 3+ leukocyte esterase, WBC and bacteria.  CT abdomen pelvis concerning for left pyelonephritis with abscess [place] concerned.  Patient given IV Rocephin and IVF in the ED with p.r.n. analgesics.     Plan:  Urine cultures pending  IVF  cc/hour  Continue IV Rocephin 2 g daily  P.r.n. analgesics and antiemetics ordered  Urology consulted:  Renal ultrasound not concerning for kidney abscess as of yet.  Recommendations to continue IV antibiotics x2 weeks and follow up urine cultures.  If abscess identified, then IR consult for drain placement  Discussed case with IR who stated drainage not warranted at this time but will need follow up imaging after antibiotic treatment   [de-identified] : Ms. ULYSSES CORTEZ  is a 68 year old female who presents to the office for a follow-up visit.  She is here to review her MRI results.  She still has tingling in her left hand.  However, she has been wearing a wrist brace and it feels better.

## 2025-05-12 NOTE — ASSESSMENT & PLAN NOTE
NANCIE without obvious abscess at this time. Discussed with pt. May develop abscess over time that would need IR drainage. Not currently necessary.   May need repeat imaging - clinically improving, no new imaging indicated at this time  Follow urine cultures - E coli ESBL, treat x 2 weeks.   Okay to consider ciprofloxacin 500mg BID PO instead of IV antibiotics at home based on culture sensitivities  Rest of care per Hospital Medicine.

## 2025-05-12 NOTE — SUBJECTIVE & OBJECTIVE
Interval History: abdominal soreness, possible constipation    Review of Systems   Gastrointestinal:  Positive for abdominal pain and constipation.   All other systems reviewed and are negative.    Objective:     Vital Signs (Most Recent):  Temp: 98.3 °F (36.8 °C) (05/12/25 0758)  Pulse: (!) 48 (05/12/25 1054)  Resp: 16 (05/12/25 1047)  BP: 139/78 (05/12/25 0758)  SpO2: 99 % (05/12/25 0758) Vital Signs (24h Range):  Temp:  [98.2 °F (36.8 °C)-99.1 °F (37.3 °C)] 98.3 °F (36.8 °C)  Pulse:  [41-60] 48  Resp:  [16-18] 16  SpO2:  [97 %-99 %] 99 %  BP: (125-146)/(68-88) 139/78     Weight: 67 kg (147 lb 11.2 oz)  Body mass index is 27.01 kg/m².  No intake or output data in the 24 hours ending 05/12/25 1104      Physical Exam  Vitals and nursing note reviewed.   Constitutional:       General: She is not in acute distress.     Appearance: She is well-developed.   HENT:      Head: Normocephalic and atraumatic.      Right Ear: External ear normal.      Left Ear: External ear normal.   Cardiovascular:      Rate and Rhythm: Normal rate and regular rhythm.   Pulmonary:      Effort: Pulmonary effort is normal. No respiratory distress.   Abdominal:      General: There is no distension.      Palpations: Abdomen is soft.   Skin:     General: Skin is warm and dry.   Neurological:      Mental Status: She is alert and oriented to person, place, and time.   Psychiatric:         Thought Content: Thought content normal.               Significant Labs: All pertinent labs within the past 24 hours have been reviewed.    Significant Imaging: I have reviewed all pertinent imaging results/findings within the past 24 hours.

## 2025-05-12 NOTE — PLAN OF CARE
Changes in medical condition or discharge plan:    Per Ephraim Hill, NP - pt will need home IV abx on discharge as well as home health. Secure chat sent to Starr Simpson RN with [facility] to determine if they're in network. Will follow up.    Does patient need new DME? No    Follow up appts needed: PCP    Medically stable: no    Estimated Discharge Date: 5/13 05/12/25 1228   Discharge Reassessment   Assessment Type Discharge Planning Reassessment   Did the patient's condition or plan change since previous assessment? Yes   Discharge Plan discussed with: Patient   Communicated ARTHUR with patient/caregiver Yes   Discharge Plan A Home Health   Discharge Plan B Home with family   DME Needed Upon Discharge  none   Transition of Care Barriers None   Why the patient remains in the hospital Requires continued medical care   Post-Acute Status   Coverage [employer]   Discharge Delays None known at this time

## 2025-05-12 NOTE — ASSESSMENT & PLAN NOTE
Patient's most recent potassium results are listed below.   Recent Labs     05/10/25  0634 05/11/25  0410 05/12/25  0434   K 2.8* 3.1* 3.0*     Plan  - Replete potassium per protocol  - Monitor potassium Daily  - Patient's hypokalemia is stable but not resolved

## 2025-05-12 NOTE — PLAN OF CARE
Per Jessica, [facility] is in network with patient's insurance and will verify benefits.     Home health referral sent to [facility] via [employer] for review.

## 2025-05-12 NOTE — PLAN OF CARE
Problem: Adult Inpatient Plan of Care  Goal: Plan of Care Review  Outcome: Progressing  Goal: Patient-Specific Goal (Individualized)  Outcome: Progressing  Goal: Absence of Hospital-Acquired Illness or Injury  Outcome: Progressing  Goal: Optimal Comfort and Wellbeing  Outcome: Progressing  Goal: Readiness for Transition of Care  Outcome: Progressing     Problem: Pain Acute  Goal: Optimal Pain Control and Function  Outcome: Progressing     Problem: Fall Injury Risk  Goal: Absence of Fall and Fall-Related Injury  Outcome: Progressing     Problem: Infection  Goal: Absence of Infection Signs and Symptoms  Outcome: Progressing

## 2025-05-12 NOTE — NURSING
[facility]  Nurses Note -- 4 Eyes        Date:  05/12/2025        Skin assessed on:  Q shift        [x] No Pressure Injuries Present                 []Prevention Measures Documented     [] Yes LDA Previously documented      [] Yes New Pressure Injury Discovered              [] LDA Added        Attending RN: AFUA Garcia      Second RN:  AFUA Stafford

## 2025-05-12 NOTE — PROGRESS NOTES
[facility] Medicine  Progress Note    Patient Name: Von Uribe  MRN: [id number]  Patient Class: IP- Inpatient   Admission Date: 5/9/2025  Length of Stay: 3 days  Attending Physician: Xiao Mcconnell MD  Primary Care Provider: [facility]        Subjective     Principal Problem:Pyelonephritis        HPI:  Von Uribe is a 44 y.o. with past medical history of uterine fibroids presents to the hospital with complaints of worsening dysuria, urinary frequency, and left lower back pain with the associated nausea and vomiting for the past 3 days.    Patient reports several episodes of emesis and diarrhea and unable to tolerate p.o..  Patient also endorses subjective fever/chills.  Per chart review, patient tested positive for gonorrhea on 04/22/2025 and was treated with Rocephin.  Patient states she has a history of frequent UTIs.  Patient denies any other alleviating exacerbating factors.  Patient denies headache, chest pain, shortness on breath, abdominal pain, vaginal discharge, hematemesis, hematochezia, hematuria, or any other associated symptoms.    In the ED: Patient afebrile with leukocytosis (WBC 22.92), hemodynamically stable on presentation, H and H 10.2/32, MCV 75, slightly elevated procalcitonin 1.05, beta HCG negative, potassium 3.1, sodium 132, ALT 9. UA with 3+ ketone 2+ blood, 3+ leukocyte esterase, WBC >100.  Urine culture pending.  CT abdomen pelvis shows left perinephric and periureteral inflammation concerning for left renal infection/pyelonephritis with the possibility of a renal abscess.  Patient given acetaminophen 500 mg, IV Rocephin 1 g, IV morphine 2 mg, IV morphine 4 mg, 1 L NS bolus x2 in the ED. Case discussed with ED provider and patient admitted to hospital medicine for further medical management.    Overview/Hospital Course:  Ms. Uribe was hospitalized secondary to pyelonephritis.  Reports upset stomach and soreness,  though nausea resolved.  No fever since noon on 5/10.  Continue IV antibiotics, IV fluids, and as needed antipyretics, antiemetics, and analgesics.    Urine culture grew ESBL, will change antibiotics to ertapenem.  Susceptibility shows sensitivity to ertapenem, no end organ damage, no severe sepsis, and she desires as early discharge as possible and will need IV antibiotics at home.  Daily dosing of ertapenem would be better for home administration.      Still with abdominal discomfort and soreness, possible constipation, trial bowel regimen.  Consider abdominal imaging if no improvement.  Will need IV abx at home for 2 weeks.      Interval History: abdominal soreness, possible constipation    Review of Systems   Gastrointestinal:  Positive for abdominal pain and constipation.   All other systems reviewed and are negative.    Objective:     Vital Signs (Most Recent):  Temp: 98.3 °F (36.8 °C) (05/12/25 0758)  Pulse: (!) 48 (05/12/25 1054)  Resp: 16 (05/12/25 1047)  BP: 139/78 (05/12/25 0758)  SpO2: 99 % (05/12/25 0758) Vital Signs (24h Range):  Temp:  [98.2 °F (36.8 °C)-99.1 °F (37.3 °C)] 98.3 °F (36.8 °C)  Pulse:  [41-60] 48  Resp:  [16-18] 16  SpO2:  [97 %-99 %] 99 %  BP: (125-146)/(68-88) 139/78     Weight: 67 kg (147 lb 11.2 oz)  Body mass index is 27.01 kg/m².  No intake or output data in the 24 hours ending 05/12/25 1104      Physical Exam  Vitals and nursing note reviewed.   Constitutional:       General: She is not in acute distress.     Appearance: She is well-developed.   HENT:      Head: Normocephalic and atraumatic.      Right Ear: External ear normal.      Left Ear: External ear normal.   Cardiovascular:      Rate and Rhythm: Normal rate and regular rhythm.   Pulmonary:      Effort: Pulmonary effort is normal. No respiratory distress.   Abdominal:      General: There is no distension.      Palpations: Abdomen is soft.   Skin:     General: Skin is warm and dry.   Neurological:      Mental Status: She is  alert and oriented to person, place, and time.   Psychiatric:         Thought Content: Thought content normal.               Significant Labs: All pertinent labs within the past 24 hours have been reviewed.    Significant Imaging: I have reviewed all pertinent imaging results/findings within the past 24 hours.      Assessment & Plan  Pyelonephritis  5/12: ESBL, abx switched to ertapenem on 5/11, appreciate Urology recs, plan is for IV abx for 2 weeks.    5/11: last fever noon yesterday, urine culture with gram negative bacilli, continue IV antibitoics  5/10:  Still with fevers and nausea, continue IV antibiotics, IV fluids, and as needed antipyretics, antiemetics, and analgesics.  Cultures pending.  Patient presents with subjective fever/chills, dysuria, urinary frequency/urgency, left CVA tenderness.  UA with 3+ leukocyte esterase, WBC and bacteria.  CT abdomen pelvis concerning for left pyelonephritis with abscess [place] concerned.  Patient given IV Rocephin and IVF in the ED with p.r.n. analgesics.     Plan:  Urine cultures pending  IVF  cc/hour  Continue IV Rocephin 2 g daily  P.r.n. analgesics and antiemetics ordered  Urology consulted:  Renal ultrasound not concerning for kidney abscess as of yet.  Recommendations to continue IV antibiotics x2 weeks and follow up urine cultures.  If abscess identified, then IR consult for drain placement  Discussed case with IR who stated drainage not warranted at this time but will need follow up imaging after antibiotic treatment  Overweight (BMI 25.0-29.9)  Body mass index is 27.01 kg/m². Overweight complicates all aspects of disease management from diagnostic modalities to treatment. Weight loss encouraged and health benefits explained to patient.     Leukocytosis  -See plan above for pyelonephritis   Hypokalemia  Patient's most recent potassium results are listed below.   Recent Labs     05/10/25  0634 05/11/25  0410 05/12/25  0434   K 2.8* 3.1* 3.0*     Plan  -  Replete potassium per protocol  - Monitor potassium Daily  - Patient's hypokalemia is stable but not resolved  VTE Risk Mitigation (From admission, onward)           Ordered     IP VTE LOW RISK PATIENT  Once         05/09/25 1347     Place sequential compression device  Until discontinued         05/09/25 1347                    Discharge Planning   ARTHUR: 5/13/2025     Code Status: Full Code   Medical Readiness for Discharge Date:   Discharge Plan A: Home with family      Ephraim Hill Jr., APRN, AGACNP-BC  Hospitalist - Department of Hospital Medicine  [facility] [address] [place] [address]  Office #: [phone]; Pager #: [phone]

## 2025-05-12 NOTE — NURSING
"Per patient increased abdominal pain 10/10, "worst than when I first came to the hospital, PRN pain med admin, Provider notified and came to bedside, New orders noted, bed locked and in low position, call light within reach.     "

## 2025-05-12 NOTE — SUBJECTIVE & OBJECTIVE
Interval History: denies flank pain, resting comfortably    Review of Systems   Constitutional:  Negative for appetite change, chills, fatigue and fever.   HENT:  Negative for congestion.    Respiratory:  Negative for chest tightness and shortness of breath.    Cardiovascular:  Negative for chest pain.   Gastrointestinal:  Negative for abdominal distention, constipation, diarrhea, nausea and vomiting.   Genitourinary:  Negative for difficulty urinating, dysuria, flank pain, frequency, hematuria, pelvic pain and urgency.   Musculoskeletal:  Negative for arthralgias.   Skin:  Negative for rash.   Neurological:  Negative for dizziness.   Psychiatric/Behavioral:  Negative for confusion.      Objective:     Temp:  [98.2 °F (36.8 °C)-99.1 °F (37.3 °C)] 99.1 °F (37.3 °C)  Pulse:  [41-60] 47  Resp:  [16-18] 16  SpO2:  [97 %-99 %] 97 %  BP: (124-139)/(68-86) 124/74     Body mass index is 27.01 kg/m².           Drains       None                    Physical Exam  Constitutional:       Appearance: She is well-developed.   HENT:      Head: Normocephalic and atraumatic.   Eyes:      Conjunctiva/sclera: Conjunctivae normal.   Pulmonary:      Effort: Pulmonary effort is normal. No respiratory distress.   Abdominal:      General: Abdomen is flat. There is no distension.      Tenderness: There is no abdominal tenderness. There is no guarding.   Skin:     Findings: No rash.   Neurological:      Mental Status: She is alert and oriented to person, place, and time.   Psychiatric:         Behavior: Behavior normal.           Significant Labs:    BMP:  Recent Labs   Lab 05/10/25  0634 05/11/25  0410 05/12/25  0434    138 137   K 2.8* 3.1* 3.0*    106 103   CO2 20* 20* 22*   BUN 5* 5* 3*   CREATININE 0.6 0.6 0.5   CALCIUM 7.6* 7.9* 8.1*       CBC:   Recent Labs   Lab 05/10/25  0633 05/11/25  0410 05/12/25  0434   WBC 15.90* 12.07 10.12   HGB 8.1* 8.0* 8.6*   HCT 25.7* 25.7* 27.4*    166 192       Urine Culture:   Recent  Labs   Lab 05/09/25  0952   LABURIN >100,000 cfu/ml Escherichia coli ESBL*

## 2025-05-13 ENCOUNTER — TELEPHONE (OUTPATIENT)
Dept: UROLOGY | Facility: CLINIC | Age: 45
End: 2025-05-13
Payer: MEDICAID

## 2025-05-13 VITALS
TEMPERATURE: 98 F | DIASTOLIC BLOOD PRESSURE: 83 MMHG | HEART RATE: 47 BPM | OXYGEN SATURATION: 99 % | BODY MASS INDEX: 27.18 KG/M2 | HEIGHT: 62 IN | SYSTOLIC BLOOD PRESSURE: 139 MMHG | RESPIRATION RATE: 17 BRPM | WEIGHT: 147.69 LBS

## 2025-05-13 LAB
ABSOLUTE EOSINOPHIL (OHS): 0.07 K/UL
ABSOLUTE MONOCYTE (OHS): 1.34 K/UL (ref 0.3–1)
ABSOLUTE NEUTROPHIL COUNT (OHS): 7.61 K/UL (ref 1.8–7.7)
ANION GAP (OHS): 12 MMOL/L (ref 8–16)
BASOPHILS # BLD AUTO: 0.03 K/UL
BASOPHILS NFR BLD AUTO: 0.3 %
BUN SERPL-MCNC: 3 MG/DL (ref 6–20)
C TRACH DNA SPEC QL NAA+PROBE: NOT DETECTED
CALCIUM SERPL-MCNC: 8.3 MG/DL (ref 8.7–10.5)
CHLORIDE SERPL-SCNC: 100 MMOL/L (ref 95–110)
CO2 SERPL-SCNC: 24 MMOL/L (ref 23–29)
CREAT SERPL-MCNC: 0.6 MG/DL (ref 0.5–1.4)
CTGC SOURCE (OHS) ORD-325: NORMAL
ERYTHROCYTE [DISTWIDTH] IN BLOOD BY AUTOMATED COUNT: 21.4 % (ref 11.5–14.5)
GFR SERPLBLD CREATININE-BSD FMLA CKD-EPI: >60 ML/MIN/1.73/M2
GLUCOSE SERPL-MCNC: 86 MG/DL (ref 70–110)
HCT VFR BLD AUTO: 27.9 % (ref 37–48.5)
HGB BLD-MCNC: 8.8 GM/DL (ref 12–16)
IMM GRANULOCYTES # BLD AUTO: 0.4 K/UL (ref 0–0.04)
IMM GRANULOCYTES NFR BLD AUTO: 3.7 % (ref 0–0.5)
LYMPHOCYTES # BLD AUTO: 1.36 K/UL (ref 1–4.8)
MAGNESIUM SERPL-MCNC: 1.8 MG/DL (ref 1.6–2.6)
MCH RBC QN AUTO: 23.8 PG (ref 27–31)
MCHC RBC AUTO-ENTMCNC: 31.5 G/DL (ref 32–36)
MCV RBC AUTO: 75 FL (ref 82–98)
N GONORRHOEA DNA UR QL NAA+PROBE: NOT DETECTED
NUCLEATED RBC (/100WBC) (OHS): 1 /100 WBC
PHOSPHATE SERPL-MCNC: 2.7 MG/DL (ref 2.7–4.5)
PLATELET # BLD AUTO: 202 K/UL (ref 150–450)
PMV BLD AUTO: 10.7 FL (ref 9.2–12.9)
POTASSIUM SERPL-SCNC: 3.2 MMOL/L (ref 3.5–5.1)
RBC # BLD AUTO: 3.7 M/UL (ref 4–5.4)
RELATIVE EOSINOPHIL (OHS): 0.6 %
RELATIVE LYMPHOCYTE (OHS): 12.6 % (ref 18–48)
RELATIVE MONOCYTE (OHS): 12.4 % (ref 4–15)
RELATIVE NEUTROPHIL (OHS): 70.4 % (ref 38–73)
SODIUM SERPL-SCNC: 136 MMOL/L (ref 136–145)
TSH SERPL-ACNC: 2.69 UIU/ML (ref 0.4–4)
WBC # BLD AUTO: 10.81 K/UL (ref 3.9–12.7)

## 2025-05-13 PROCEDURE — 25000003 PHARM REV CODE 250: Performed by: PHYSICIAN ASSISTANT

## 2025-05-13 PROCEDURE — 84443 ASSAY THYROID STIM HORMONE: CPT | Performed by: PHYSICIAN ASSISTANT

## 2025-05-13 PROCEDURE — 93005 ELECTROCARDIOGRAM TRACING: CPT

## 2025-05-13 PROCEDURE — 36415 COLL VENOUS BLD VENIPUNCTURE: CPT | Performed by: PHYSICIAN ASSISTANT

## 2025-05-13 PROCEDURE — 63600175 PHARM REV CODE 636 W HCPCS: Performed by: HOSPITALIST

## 2025-05-13 PROCEDURE — 63600175 PHARM REV CODE 636 W HCPCS

## 2025-05-13 PROCEDURE — 94761 N-INVAS EAR/PLS OXIMETRY MLT: CPT

## 2025-05-13 PROCEDURE — 93010 ELECTROCARDIOGRAM REPORT: CPT | Mod: ,,, | Performed by: INTERNAL MEDICINE

## 2025-05-13 PROCEDURE — 85025 COMPLETE CBC W/AUTO DIFF WBC: CPT | Performed by: PHYSICIAN ASSISTANT

## 2025-05-13 PROCEDURE — 83735 ASSAY OF MAGNESIUM: CPT | Performed by: PHYSICIAN ASSISTANT

## 2025-05-13 PROCEDURE — 25000003 PHARM REV CODE 250: Performed by: HOSPITALIST

## 2025-05-13 PROCEDURE — 99232 SBSQ HOSP IP/OBS MODERATE 35: CPT | Mod: ,,, | Performed by: UROLOGY

## 2025-05-13 PROCEDURE — 84100 ASSAY OF PHOSPHORUS: CPT | Performed by: PHYSICIAN ASSISTANT

## 2025-05-13 PROCEDURE — 80048 BASIC METABOLIC PNL TOTAL CA: CPT | Performed by: PHYSICIAN ASSISTANT

## 2025-05-13 RX ORDER — CIPROFLOXACIN 500 MG/1
500 TABLET, FILM COATED ORAL EVERY 12 HOURS
Qty: 24 TABLET | Refills: 0 | Status: SHIPPED | OUTPATIENT
Start: 2025-05-13

## 2025-05-13 RX ORDER — POTASSIUM CHLORIDE 750 MG/1
30 TABLET, EXTENDED RELEASE ORAL ONCE
Status: COMPLETED | OUTPATIENT
Start: 2025-05-13 | End: 2025-05-13

## 2025-05-13 RX ADMIN — MORPHINE SULFATE 4 MG: 4 INJECTION INTRAVENOUS at 07:05

## 2025-05-13 RX ADMIN — MORPHINE SULFATE 4 MG: 4 INJECTION INTRAVENOUS at 01:05

## 2025-05-13 RX ADMIN — POTASSIUM CHLORIDE 30 MEQ: 750 TABLET, EXTENDED RELEASE ORAL at 08:05

## 2025-05-13 RX ADMIN — ERTAPENEM 1 G: 1 INJECTION INTRAMUSCULAR; INTRAVENOUS at 01:05

## 2025-05-13 NOTE — DISCHARGE SUMMARY
[facility] Medicine  Discharge Summary      Patient Name: Von Uribe  MRN: [id number]  DERRELL: [id number]  Patient Class: IP- Inpatient  Admission Date: 5/9/2025  Hospital Length of Stay: 4 days  Discharge Date and Time: 05/13/2025 9:14 AM  Attending Physician: Xiao Mcconnell MD   Discharging Provider: Nicolas Castro PA-C  Primary Care Provider: [facility]-No    Primary Care Team: NICOLAS CASTRO    HPI:   Von Uribe is a 44 y.o. with past medical history of uterine fibroids presents to the hospital with complaints of worsening dysuria, urinary frequency, and left lower back pain with the associated nausea and vomiting for the past 3 days.    Patient reports several episodes of emesis and diarrhea and unable to tolerate p.o..  Patient also endorses subjective fever/chills.  Per chart review, patient tested positive for gonorrhea on 04/22/2025 and was treated with Rocephin.  Patient states she has a history of frequent UTIs.  Patient denies any other alleviating exacerbating factors.  Patient denies headache, chest pain, shortness on breath, abdominal pain, vaginal discharge, hematemesis, hematochezia, hematuria, or any other associated symptoms.    In the ED: Patient afebrile with leukocytosis (WBC 22.92), hemodynamically stable on presentation, H and H 10.2/32, MCV 75, slightly elevated procalcitonin 1.05, beta HCG negative, potassium 3.1, sodium 132, ALT 9. UA with 3+ ketone 2+ blood, 3+ leukocyte esterase, WBC >100.  Urine culture pending.  CT abdomen pelvis shows left perinephric and periureteral inflammation concerning for left renal infection/pyelonephritis with the possibility of a renal abscess.  Patient given acetaminophen 500 mg, IV Rocephin 1 g, IV morphine 2 mg, IV morphine 4 mg, 1 L NS bolus x2 in the ED. Case discussed with ED provider and patient admitted to hospital medicine for further medical management.    * No surgery found *       Hospital Course:   Ms. Uribe was hospitalized secondary to pyelonephritis.  Reports upset stomach and soreness, though nausea resolved.  No fever since noon on 5/10.  Continue IV antibiotics, IV fluids, and as needed antipyretics, antiemetics, and analgesics.Urine culture grew ESBL, will change antibiotics to ertapenem.  Susceptibility shows sensitivity to ertapenem and ciprofloxacin, no end organ damage, no severe sepsis.  Abdominal pain persistent and repeat CT scan was obtained with findings of left-sided pyelonephritis with concern for possible renal abscess as well as mild periportal edema which was attributed to pyelonephritis.  These findings were discussed with Urology and no intervention is required.  She will discharge on ciprofloxacin for 2 weeks with outpatient neurology follow up.  At discharge her abdominal pain has resolved and she is tolerating a diet. Note was made of a bradycardia while admitted in high 40s to low 50s. EKG confirms this is sinus bradycardia, with normal BP and asymptomatic. Is not on rate lowering medications outpatient. Possibly vagal response to pain causing bradycardia.      Goals of Care Treatment Preferences:  Code Status: Full Code      SDOH Screening:  The patient was screened for food insecurity, housing instability, transportation needs, utility difficulties, and interpersonal safety. The social determinant(s) of health identified as a concern this admission are:  Food insecurity  Transportation difficulties    Will discuss with case management and/or community health workers.    Social Drivers of Health with Concerns     Food Insecurity: Food Insecurity Present (5/10/2025)   Transportation Needs: Unmet Transportation Needs (5/10/2025)        Consults:   Consults (From admission, onward)          Status Ordering Provider     Inpatient consult to Midline team  Once        Provider:  (Not yet assigned)    Acknowledged GUSTAVO FOSTER JR     Inpatient consult to Urology   Once        Provider:  Rigoberto Bullock MD    Completed JUNI RICH            Assessment & Plan  Pyelonephritis  5/12: ESBL, abx switched to ertapenem on 5/11, appreciate Urology recs, plan is for IV abx for 2 weeks.    5/11: last fever noon yesterday, urine culture with gram negative bacilli, continue IV antibitoics  5/10:  Still with fevers and nausea, continue IV antibiotics, IV fluids, and as needed antipyretics, antiemetics, and analgesics.  Cultures pending.  Patient presents with subjective fever/chills, dysuria, urinary frequency/urgency, left CVA tenderness.  UA with 3+ leukocyte esterase, WBC and bacteria.  CT abdomen pelvis concerning for left pyelonephritis with abscess [place] concerned.  Patient given IV Rocephin and IVF in the ED with p.r.n. analgesics.     Plan:  Urine cultures pending  IVF  cc/hour  Continue IV Rocephin 2 g daily  P.r.n. analgesics and antiemetics ordered  Urology consulted:  Renal ultrasound not concerning for kidney abscess as of yet.  Recommendations to continue IV antibiotics x2 weeks and follow up urine cultures.  If abscess identified, then IR consult for drain placement  Discussed case with IR who stated drainage not warranted at this time but will need follow up imaging after antibiotic treatment  Overweight (BMI 25.0-29.9)  Body mass index is 27.01 kg/m². Overweight complicates all aspects of disease management from diagnostic modalities to treatment. Weight loss encouraged and health benefits explained to patient.     Leukocytosis  -See plan above for pyelonephritis   Hypokalemia  Patient's most recent potassium results are listed below.   Recent Labs     05/11/25  0410 05/12/25  0434 05/13/25  0717   K 3.1* 3.0* 3.2*     Plan  - Replete potassium per protocol  - Monitor potassium Daily  - Patient's hypokalemia is stable but not resolved  Final Active Diagnoses:    Diagnosis Date Noted POA    PRINCIPAL PROBLEM:  Pyelonephritis [N12] 03/18/2019 Yes     Overweight (BMI 25.0-29.9) [E66.3] 05/09/2025 Yes    Leukocytosis [D72.829] 05/09/2025 Yes    Hypokalemia [E87.6] 05/09/2025 Yes      Problems Resolved During this Admission:       Discharged Condition: stable    Disposition: Home or Self Care    Follow Up:   Follow-up Information       Rigoberto Bullock MD. Call in 1 day(s).    Specialty: Urology  Contact information:  [address]  [address]  [place] [id number] [phone]                           Patient Instructions:      Diet Adult Regular     Notify your health care provider if you experience any of the following:  temperature >100.4     Notify your health care provider if you experience any of the following:  persistent nausea and vomiting or diarrhea     Notify your health care provider if you experience any of the following:  increased confusion or weakness     Notify your health care provider if you experience any of the following:  persistent dizziness, light-headedness, or visual disturbances     Activity as tolerated       Significant Diagnostic Studies: Labs: CMP   Recent Labs   Lab 05/12/25  0434 05/13/25  0717    136   K 3.0* 3.2*    100   CO2 22* 24   GLU 84 86   BUN 3* 3*   CREATININE 0.5 0.6   CALCIUM 8.1* 8.3*   PROT 6.2  --    ALBUMIN 2.4*  --    BILITOT 0.4  --    ALKPHOS 79  --    AST 32  --    ALT 39  --    ANIONGAP 12 12    and CBC   Recent Labs   Lab 05/12/25  0434 05/13/25  0717   WBC 10.12 10.81   HGB 8.6* 8.8*   HCT 27.4* 27.9*    202       Pending Diagnostic Studies:       Procedure Component Value Units Date/Time    EKG 12-lead [[id number]]     Order Status: Sent Lab Status: No result            Medications:  Reconciled Home Medications:      Medication List        START taking these medications      ciprofloxacin HCl 500 MG tablet  Commonly known as: CIPRO  Take 1 tablet (500 mg total) by mouth every 12 (twelve) hours.            CONTINUE taking these medications      * acetaminophen 650 MG Tbsr  Commonly  known as: TYLENOL  Take 1 tablet (650 mg total) by mouth every 8 (eight) hours.     * acetaminophen 500 MG tablet  Commonly known as: TYLENOL  Take 1 tablet (500 mg total) by mouth every 6 (six) hours as needed.     * ibuprofen 600 MG tablet  Commonly known as: ADVIL,MOTRIN  Take 1 tablet (600 mg total) by mouth every 6 (six) hours as needed for Pain.     * ibuprofen 600 MG tablet  Commonly known as: ADVIL,MOTRIN  Take 1 tablet (600 mg total) by mouth every 6 (six) hours as needed for Pain.           * This list has 4 medication(s) that are the same as other medications prescribed for you. Read the directions carefully, and ask your doctor or other care provider to review them with you.                  Indwelling Lines/Drains at time of discharge:   Lines/Drains/Airways       None                   Time spent on the discharge of patient: 37 minutes         Nicolas Castro PA-C  Department of Hospital Medicine  [facility] [place] - Med Surg

## 2025-05-13 NOTE — NURSING
Discharge instruction handed to pt. Pt verbalizes understanding. Denies any questions. Both IV and midline discontinued and catheters intact. Vital signs stable, NADN, and afebrile. Cardiac monitoring dc and tele monitor tech notified. Discharged home with family at bedside.

## 2025-05-13 NOTE — TELEPHONE ENCOUNTER
----- Message from  Radha sent at 5/13/2025 11:37 AM CDT -----  Regarding: Follow up appointment  Pt is discharging today. Please schedule and follow up.

## 2025-05-13 NOTE — PLAN OF CARE
Problem: Adult Inpatient Plan of Care  Goal: Plan of Care Review  Outcome: Met  Goal: Patient-Specific Goal (Individualized)  Outcome: Met  Goal: Absence of Hospital-Acquired Illness or Injury  Outcome: Met  Goal: Optimal Comfort and Wellbeing  Outcome: Met  Goal: Readiness for Transition of Care  Outcome: Met     Problem: Pain Acute  Goal: Optimal Pain Control and Function  Outcome: Met     Problem: Fall Injury Risk  Goal: Absence of Fall and Fall-Related Injury  Outcome: Met     Problem: Infection  Goal: Absence of Infection Signs and Symptoms  Outcome: Met

## 2025-05-13 NOTE — PLAN OF CARE
Case Management Final Discharge Note      Discharge Disposition: Home    New DME ordered / company name: None    Relevant SDOH / Transition of Care Barriers:  None    Primary Caretaker and contact information:     Rolo Uribe (Father)  [phone] (Mobile)       Scheduled followup appointment: Message sent to urology staff to follow up and schedule appointment. If referral is denied, we will send it to [facility].    PCP- patient will call and schedule.     Referrals placed: Urology    Transportation: Private vehicle        Patient and family educated on discharge services and updated on DC plan. Bedside RN notified, patient clear to discharge from Case Management Perspective.        05/13/25 1216   Final Note   Assessment Type Final Discharge Note   Anticipated Discharge Disposition Home   What phone number can be called within the next 1-3 days to see how you are doing after discharge? [phone]   Hospital Resources/Appts/Education Provided Provided patient/caregiver with written discharge plan information;Provided education on problems/symptoms using teachback;Appointments scheduled and added to AVS   Post-Acute Status   Coverage MEDICAID - [employer] [facility]   Discharge Delays None known at this time

## 2025-05-13 NOTE — SUBJECTIVE & OBJECTIVE
Interval History: feeling better, no flank pain or fever.    Review of Systems   Constitutional:  Negative for chills, fatigue and fever.   HENT:  Negative for congestion.    Respiratory:  Negative for chest tightness and shortness of breath.    Cardiovascular:  Negative for chest pain.   Gastrointestinal:  Negative for abdominal distention, constipation, diarrhea, nausea and vomiting.   Genitourinary:  Negative for difficulty urinating, dysuria, flank pain, frequency, hematuria, pelvic pain and urgency.   Musculoskeletal:  Negative for arthralgias.   Skin:  Negative for rash.   Neurological:  Negative for dizziness.   Psychiatric/Behavioral:  Negative for confusion.      Objective:     Temp:  [98.3 °F (36.8 °C)-99.3 °F (37.4 °C)] 98.3 °F (36.8 °C)  Pulse:  [47-53] 51  Resp:  [16-19] 16  SpO2:  [94 %-99 %] 98 %  BP: (124-144)/(74-88) 127/77     Body mass index is 27.01 kg/m².           Drains       None                    Physical Exam  Vitals and nursing note reviewed.   Constitutional:       Appearance: She is well-developed.   HENT:      Head: Normocephalic.   Eyes:      Conjunctiva/sclera: Conjunctivae normal.   Neck:      Thyroid: No thyromegaly.      Trachea: No tracheal deviation.   Cardiovascular:      Rate and Rhythm: Normal rate.      Pulses: Normal pulses.      Heart sounds: Normal heart sounds.   Pulmonary:      Effort: Pulmonary effort is normal. No respiratory distress.      Breath sounds: Normal breath sounds. No wheezing.   Abdominal:      General: There is no distension.      Palpations: Abdomen is soft. There is no mass.      Tenderness: There is no abdominal tenderness. There is no guarding or rebound.      Hernia: No hernia is present.   Musculoskeletal:         General: No tenderness. Normal range of motion.      Cervical back: Normal range of motion.   Lymphadenopathy:      Cervical: No cervical adenopathy.   Skin:     General: Skin is warm and dry.      Findings: No erythema or rash.  "  Neurological:      Mental Status: She is alert and oriented to person, place, and time.   Psychiatric:         Behavior: Behavior normal.         Thought Content: Thought content normal.         Judgment: Judgment normal.           Significant Labs:    BMP:  Recent Labs   Lab 05/10/25  0634 05/11/25  0410 05/12/25  0434    138 137   K 2.8* 3.1* 3.0*    106 103   CO2 20* 20* 22*   BUN 5* 5* 3*   CREATININE 0.6 0.6 0.5   CALCIUM 7.6* 7.9* 8.1*       CBC:   Recent Labs   Lab 05/10/25  0633 05/11/25 0410 05/12/25  0434   WBC 15.90* 12.07 10.12   HGB 8.1* 8.0* 8.6*   HCT 25.7* 25.7* 27.4*    166 192       Blood Culture: No results for input(s): "LABBLOO" in the last 168 hours.  Urine Culture:   Recent Labs   Lab 05/09/25  0952   LABURIN >100,000 cfu/ml Escherichia coli ESBL*       Significant Imaging:  CT: I have reviewed all results within the past 24 hours and my personal findings are:  CT 5/12, slight evolution of left sided abscess.  Area of likely liquefaction small.                 "

## 2025-05-13 NOTE — ASSESSMENT & PLAN NOTE
Patient's most recent potassium results are listed below.   Recent Labs     05/11/25  0410 05/12/25  0434 05/13/25  0717   K 3.1* 3.0* 3.2*     Plan  - Replete potassium per protocol  - Monitor potassium Daily  - Patient's hypokalemia is stable but not resolved

## 2025-05-13 NOTE — ASSESSMENT & PLAN NOTE
NANCIE without obvious abscess at this time. Discussed with pt. May develop abscess over time that would need IR drainage. Not currently necessary.     Clinically improving without drainage or aspiration    Follow urine cultures - E coli ESBL, treat x 2 weeks.   Okay to consider ciprofloxacin 500mg BID PO instead of IV antibiotics at home based on culture sensitivities  Rest of care per Hospital Medicine.

## 2025-05-13 NOTE — ASSESSMENT & PLAN NOTE
5/12: ESBL, abx switched to ertapenem on 5/11, appreciate Urology recs, plan is for IV abx for 2 weeks.    5/11: last fever noon yesterday, urine culture with gram negative bacilli, continue IV antibitoics  5/10:  Still with fevers and nausea, continue IV antibiotics, IV fluids, and as needed antipyretics, antiemetics, and analgesics.  Cultures pending.  Patient presents with subjective fever/chills, dysuria, urinary frequency/urgency, left CVA tenderness.  UA with 3+ leukocyte esterase, WBC and bacteria.  CT abdomen pelvis concerning for left pyelonephritis with abscess [place] concerned.  Patient given IV Rocephin and IVF in the ED with p.r.n. analgesics.     Plan:  Urine cultures pending  IVF  cc/hour  Continue IV Rocephin 2 g daily  P.r.n. analgesics and antiemetics ordered  Urology consulted:  Renal ultrasound not concerning for kidney abscess as of yet.  Recommendations to continue IV antibiotics x2 weeks and follow up urine cultures.  If abscess identified, then IR consult for drain placement  Discussed case with IR who stated drainage not warranted at this time but will need follow up imaging after antibiotic treatment

## 2025-05-13 NOTE — NURSING
[facility] [place]  Nurses Note -- 4 Eyes      5/12/2025       Skin assessed on: Q Shift      [x] No Pressure Injuries Present    [x]Prevention Measures Documented    [] Yes LDA  for Pressure Injury Previously documented     [] Yes New Pressure Injury Discovered   [] LDA for New Pressure Injury Added      Attending RN:  Elda Walton RN     Second RN:  AFUA Garcia

## 2025-05-13 NOTE — PROGRESS NOTES
[facility]  Urology  Progress Note    Patient Name: Von Uribe  MRN: [id number]  Admission Date: 5/9/2025  Hospital Length of Stay: 4 days  Code Status: Full Code   Attending Provider: Xiao Mcconnell MD   Primary Care Physician: [facility]    Subjective:     HPI:  Pyelonephritis/Abscess  Von Uribe is a 44 y.o. woman who started with urinary frequency, urgency, and dysuria about 5 days ago.  The symptoms progressed to left-sided flank pain fever chills over the last couple of days.  She denies any recent  intervention or history of kidney stones.  She did have a recent gyn infection.    Interval History: feeling better, no flank pain or fever.    Review of Systems   Constitutional:  Negative for chills, fatigue and fever.   HENT:  Negative for congestion.    Respiratory:  Negative for chest tightness and shortness of breath.    Cardiovascular:  Negative for chest pain.   Gastrointestinal:  Negative for abdominal distention, constipation, diarrhea, nausea and vomiting.   Genitourinary:  Negative for difficulty urinating, dysuria, flank pain, frequency, hematuria, pelvic pain and urgency.   Musculoskeletal:  Negative for arthralgias.   Skin:  Negative for rash.   Neurological:  Negative for dizziness.   Psychiatric/Behavioral:  Negative for confusion.      Objective:     Temp:  [98.3 °F (36.8 °C)-99.3 °F (37.4 °C)] 98.3 °F (36.8 °C)  Pulse:  [47-53] 51  Resp:  [16-19] 16  SpO2:  [94 %-99 %] 98 %  BP: (124-144)/(74-88) 127/77     Body mass index is 27.01 kg/m².           Drains       None                    Physical Exam  Vitals and nursing note reviewed.   Constitutional:       Appearance: She is well-developed.   HENT:      Head: Normocephalic.   Eyes:      Conjunctiva/sclera: Conjunctivae normal.   Neck:      Thyroid: No thyromegaly.      Trachea: No tracheal deviation.   Cardiovascular:      Rate and Rhythm: Normal rate.      Pulses: Normal pulses.     "  Heart sounds: Normal heart sounds.   Pulmonary:      Effort: Pulmonary effort is normal. No respiratory distress.      Breath sounds: Normal breath sounds. No wheezing.   Abdominal:      General: There is no distension.      Palpations: Abdomen is soft. There is no mass.      Tenderness: There is no abdominal tenderness. There is no guarding or rebound.      Hernia: No hernia is present.   Musculoskeletal:         General: No tenderness. Normal range of motion.      Cervical back: Normal range of motion.   Lymphadenopathy:      Cervical: No cervical adenopathy.   Skin:     General: Skin is warm and dry.      Findings: No erythema or rash.   Neurological:      Mental Status: She is alert and oriented to person, place, and time.   Psychiatric:         Behavior: Behavior normal.         Thought Content: Thought content normal.         Judgment: Judgment normal.           Significant Labs:    BMP:  Recent Labs   Lab 05/10/25  0634 05/11/25  0410 05/12/25  0434    138 137   K 2.8* 3.1* 3.0*    106 103   CO2 20* 20* 22*   BUN 5* 5* 3*   CREATININE 0.6 0.6 0.5   CALCIUM 7.6* 7.9* 8.1*       CBC:   Recent Labs   Lab 05/10/25  0633 05/11/25  0410 05/12/25  0434   WBC 15.90* 12.07 10.12   HGB 8.1* 8.0* 8.6*   HCT 25.7* 25.7* 27.4*    166 192       Blood Culture: No results for input(s): "LABBLOO" in the last 168 hours.  Urine Culture:   Recent Labs   Lab 05/09/25  0952   LABURIN >100,000 cfu/ml Escherichia coli ESBL*       Significant Imaging:  CT: I have reviewed all results within the past 24 hours and my personal findings are:  CT 5/12, slight evolution of left sided abscess.  Area of likely liquefaction small.                   Assessment/Plan:     * Pyelonephritis  NANCIE without obvious abscess at this time. Discussed with pt. May develop abscess over time that would need IR drainage. Not currently necessary.     Clinically improving without drainage or aspiration    Follow urine cultures - E coli " ESBL, treat x 2 weeks.   Okay to consider ciprofloxacin 500mg BID PO instead of IV antibiotics at home based on culture sensitivities  Rest of care per Hospital Medicine.    Leukocytosis   - Improving   - Continue abx, f/u cultures        VTE Risk Mitigation (From admission, onward)           Ordered     IP VTE LOW RISK PATIENT  Once         05/09/25 1347     Place sequential compression device  Until discontinued         05/09/25 1347                    Rigoberto Bullock MD  Urology  [facility]

## 2025-05-14 LAB
OHS QRS DURATION: 88 MS
OHS QTC CALCULATION: 390 MS

## 2025-05-15 LAB
BACTERIA BLD CULT: NORMAL
BACTERIA BLD CULT: NORMAL